# Patient Record
Sex: FEMALE | Race: WHITE | NOT HISPANIC OR LATINO | Employment: OTHER | URBAN - NONMETROPOLITAN AREA
[De-identification: names, ages, dates, MRNs, and addresses within clinical notes are randomized per-mention and may not be internally consistent; named-entity substitution may affect disease eponyms.]

---

## 2018-02-20 ENCOUNTER — OFFICE VISIT CONVERTED (OUTPATIENT)
Dept: FAMILY MEDICINE CLINIC | Age: 63
End: 2018-02-20
Attending: NURSE PRACTITIONER

## 2018-02-22 LAB
AGE GDLN ACOG TESTING: NORMAL
HPV I/H RISK 4 DNA CVX QL PROBE+SIG AMP: NEGATIVE

## 2018-08-29 ENCOUNTER — OFFICE VISIT CONVERTED (OUTPATIENT)
Dept: FAMILY MEDICINE CLINIC | Age: 63
End: 2018-08-29
Attending: NURSE PRACTITIONER

## 2019-04-10 ENCOUNTER — OFFICE VISIT CONVERTED (OUTPATIENT)
Dept: FAMILY MEDICINE CLINIC | Age: 64
End: 2019-04-10
Attending: NURSE PRACTITIONER

## 2019-04-10 ENCOUNTER — HOSPITAL ENCOUNTER (OUTPATIENT)
Dept: OTHER | Facility: HOSPITAL | Age: 64
Discharge: HOME | End: 2019-04-10
Attending: NURSE PRACTITIONER

## 2019-04-10 LAB
ALBUMIN SERPL-MCNC: 4.4 G/DL (ref 3.5–5)
ALBUMIN/GLOB SERPL: 1.6 {RATIO} (ref 1.4–2.6)
ALP SERPL-CCNC: 59 U/L (ref 43–160)
ALT SERPL-CCNC: 40 U/L (ref 10–40)
ANION GAP SERPL CALC-SCNC: 16 MMOL/L (ref 8–19)
AST SERPL-CCNC: 32 U/L (ref 15–50)
BILIRUB SERPL-MCNC: 0.44 MG/DL (ref 0.2–1.3)
BUN SERPL-MCNC: 8 MG/DL (ref 5–25)
BUN/CREAT SERPL: 14 {RATIO} (ref 6–20)
CALCIUM SERPL-MCNC: 9.5 MG/DL (ref 8.7–10.4)
CHLORIDE SERPL-SCNC: 102 MMOL/L (ref 99–111)
CHOLEST SERPL-MCNC: 171 MG/DL (ref 107–200)
CHOLEST/HDLC SERPL: 4.3 {RATIO} (ref 3–6)
CONV CO2: 28 MMOL/L (ref 22–32)
CONV TOTAL PROTEIN: 7.1 G/DL (ref 6.3–8.2)
CREAT UR-MCNC: 0.56 MG/DL (ref 0.5–0.9)
GFR SERPLBLD BASED ON 1.73 SQ M-ARVRAT: >60 ML/MIN/{1.73_M2}
GLOBULIN UR ELPH-MCNC: 2.7 G/DL (ref 2–3.5)
GLUCOSE SERPL-MCNC: 127 MG/DL (ref 65–99)
HDLC SERPL-MCNC: 40 MG/DL (ref 40–60)
LDLC SERPL CALC-MCNC: 89 MG/DL (ref 70–100)
OSMOLALITY SERPL CALC.SUM OF ELEC: 294 MOSM/KG (ref 273–304)
POTASSIUM SERPL-SCNC: 4.4 MMOL/L (ref 3.5–5.3)
SODIUM SERPL-SCNC: 142 MMOL/L (ref 135–147)
TRIGL SERPL-MCNC: 208 MG/DL (ref 40–150)
TSH SERPL-ACNC: 2.71 M[IU]/L (ref 0.27–4.2)
VLDLC SERPL-MCNC: 42 MG/DL (ref 5–37)

## 2019-04-11 ENCOUNTER — HOSPITAL ENCOUNTER (OUTPATIENT)
Dept: OTHER | Facility: HOSPITAL | Age: 64
Discharge: HOME | End: 2019-04-11
Attending: NURSE PRACTITIONER

## 2019-04-12 ENCOUNTER — HOSPITAL ENCOUNTER (OUTPATIENT)
Dept: OTHER | Facility: HOSPITAL | Age: 64
Discharge: HOME | End: 2019-04-12
Attending: NURSE PRACTITIONER

## 2019-04-12 LAB
EST. AVERAGE GLUCOSE BLD GHB EST-MCNC: 131 MG/DL
HBA1C MFR BLD: 6.2 % (ref 3.5–5.7)

## 2019-04-15 LAB
CONV LAST MENSTURAL PERIOD: NORMAL
SPECIMEN SOURCE: NORMAL
SPECIMEN SOURCE: NORMAL
THIN PREP CVX: NORMAL

## 2019-11-14 ENCOUNTER — HOSPITAL ENCOUNTER (OUTPATIENT)
Dept: SURGERY | Facility: HOSPITAL | Age: 64
Setting detail: HOSPITAL OUTPATIENT SURGERY
Discharge: HOME | End: 2019-11-14
Attending: OPHTHALMOLOGY

## 2019-11-27 ENCOUNTER — HOSPITAL ENCOUNTER (OUTPATIENT)
Dept: OTHER | Facility: HOSPITAL | Age: 64
Discharge: HOME | End: 2019-11-27
Attending: NURSE PRACTITIONER

## 2019-11-27 ENCOUNTER — OFFICE VISIT CONVERTED (OUTPATIENT)
Dept: FAMILY MEDICINE CLINIC | Age: 64
End: 2019-11-27
Attending: NURSE PRACTITIONER

## 2019-11-27 LAB
ALBUMIN SERPL-MCNC: 4.2 G/DL (ref 3.5–5)
ALBUMIN/GLOB SERPL: 1.5 {RATIO} (ref 1.4–2.6)
ALP SERPL-CCNC: 64 U/L (ref 43–160)
ALT SERPL-CCNC: 31 U/L (ref 10–40)
ANION GAP SERPL CALC-SCNC: 17 MMOL/L (ref 8–19)
AST SERPL-CCNC: 28 U/L (ref 15–50)
BILIRUB SERPL-MCNC: 0.36 MG/DL (ref 0.2–1.3)
BUN SERPL-MCNC: 11 MG/DL (ref 5–25)
BUN/CREAT SERPL: 19 {RATIO} (ref 6–20)
CALCIUM SERPL-MCNC: 9.5 MG/DL (ref 8.7–10.4)
CHLORIDE SERPL-SCNC: 101 MMOL/L (ref 99–111)
CHOLEST SERPL-MCNC: 169 MG/DL (ref 107–200)
CHOLEST/HDLC SERPL: 4.2 {RATIO} (ref 3–6)
CONV CO2: 27 MMOL/L (ref 22–32)
CONV TOTAL PROTEIN: 7 G/DL (ref 6.3–8.2)
CREAT UR-MCNC: 0.59 MG/DL (ref 0.5–0.9)
EST. AVERAGE GLUCOSE BLD GHB EST-MCNC: 148 MG/DL
GFR SERPLBLD BASED ON 1.73 SQ M-ARVRAT: >60 ML/MIN/{1.73_M2}
GLOBULIN UR ELPH-MCNC: 2.8 G/DL (ref 2–3.5)
GLUCOSE SERPL-MCNC: 120 MG/DL (ref 65–99)
HBA1C MFR BLD: 6.8 % (ref 3.5–5.7)
HDLC SERPL-MCNC: 40 MG/DL (ref 40–60)
LDLC SERPL CALC-MCNC: 78 MG/DL (ref 70–100)
OSMOLALITY SERPL CALC.SUM OF ELEC: 293 MOSM/KG (ref 273–304)
POTASSIUM SERPL-SCNC: 3.9 MMOL/L (ref 3.5–5.3)
SODIUM SERPL-SCNC: 141 MMOL/L (ref 135–147)
TRIGL SERPL-MCNC: 255 MG/DL (ref 40–150)
VLDLC SERPL-MCNC: 51 MG/DL (ref 5–37)

## 2020-03-04 ENCOUNTER — HOSPITAL ENCOUNTER (OUTPATIENT)
Dept: OTHER | Facility: HOSPITAL | Age: 65
Discharge: HOME | End: 2020-03-04
Attending: NURSE PRACTITIONER

## 2020-03-04 LAB
ALBUMIN SERPL-MCNC: 4.1 G/DL (ref 3.5–5)
ALBUMIN/GLOB SERPL: 1.6 {RATIO} (ref 1.4–2.6)
ALP SERPL-CCNC: 55 U/L (ref 43–160)
ALT SERPL-CCNC: 33 U/L (ref 10–40)
ANION GAP SERPL CALC-SCNC: 18 MMOL/L (ref 8–19)
AST SERPL-CCNC: 32 U/L (ref 15–50)
BILIRUB SERPL-MCNC: 0.34 MG/DL (ref 0.2–1.3)
BUN SERPL-MCNC: 10 MG/DL (ref 5–25)
BUN/CREAT SERPL: 16 {RATIO} (ref 6–20)
CALCIUM SERPL-MCNC: 9.5 MG/DL (ref 8.7–10.4)
CHLORIDE SERPL-SCNC: 103 MMOL/L (ref 99–111)
CONV CO2: 25 MMOL/L (ref 22–32)
CONV TOTAL PROTEIN: 6.7 G/DL (ref 6.3–8.2)
CREAT UR-MCNC: 0.64 MG/DL (ref 0.5–0.9)
GFR SERPLBLD BASED ON 1.73 SQ M-ARVRAT: >60 ML/MIN/{1.73_M2}
GLOBULIN UR ELPH-MCNC: 2.6 G/DL (ref 2–3.5)
GLUCOSE SERPL-MCNC: 155 MG/DL (ref 65–99)
OSMOLALITY SERPL CALC.SUM OF ELEC: 296 MOSM/KG (ref 273–304)
POTASSIUM SERPL-SCNC: 4.4 MMOL/L (ref 3.5–5.3)
SODIUM SERPL-SCNC: 142 MMOL/L (ref 135–147)

## 2020-03-05 LAB
EST. AVERAGE GLUCOSE BLD GHB EST-MCNC: 166 MG/DL
HBA1C MFR BLD: 7.4 % (ref 3.5–5.7)

## 2020-06-12 ENCOUNTER — HOSPITAL ENCOUNTER (OUTPATIENT)
Dept: OTHER | Facility: HOSPITAL | Age: 65
Discharge: HOME | End: 2020-06-12
Attending: NURSE PRACTITIONER

## 2020-06-12 LAB
ALBUMIN SERPL-MCNC: 4.1 G/DL (ref 3.5–5)
ALBUMIN/GLOB SERPL: 1.5 {RATIO} (ref 1.4–2.6)
ALP SERPL-CCNC: 59 U/L (ref 43–160)
ALT SERPL-CCNC: 38 U/L (ref 10–40)
ANION GAP SERPL CALC-SCNC: 17 MMOL/L (ref 8–19)
AST SERPL-CCNC: 32 U/L (ref 15–50)
BILIRUB SERPL-MCNC: 0.4 MG/DL (ref 0.2–1.3)
BUN SERPL-MCNC: 12 MG/DL (ref 5–25)
BUN/CREAT SERPL: 17 {RATIO} (ref 6–20)
CALCIUM SERPL-MCNC: 10 MG/DL (ref 8.7–10.4)
CHLORIDE SERPL-SCNC: 99 MMOL/L (ref 99–111)
CHOLEST SERPL-MCNC: 165 MG/DL (ref 107–200)
CHOLEST/HDLC SERPL: 4.5 {RATIO} (ref 3–6)
CONV CO2: 27 MMOL/L (ref 22–32)
CONV CREATININE URINE, RANDOM: 72.2 MG/DL (ref 10–300)
CONV MICROALBUM.,U,RANDOM: <12 MG/L (ref 0–20)
CONV TOTAL PROTEIN: 6.8 G/DL (ref 6.3–8.2)
CREAT UR-MCNC: 0.7 MG/DL (ref 0.5–0.9)
EST. AVERAGE GLUCOSE BLD GHB EST-MCNC: 166 MG/DL
GFR SERPLBLD BASED ON 1.73 SQ M-ARVRAT: >60 ML/MIN/{1.73_M2}
GLOBULIN UR ELPH-MCNC: 2.7 G/DL (ref 2–3.5)
GLUCOSE SERPL-MCNC: 131 MG/DL (ref 65–99)
HBA1C MFR BLD: 7.4 % (ref 3.5–5.7)
HDLC SERPL-MCNC: 37 MG/DL (ref 40–60)
LDLC SERPL CALC-MCNC: 88 MG/DL (ref 70–100)
MICROALBUMIN/CREAT UR: 16.6 MG/G{CRE} (ref 0–35)
OSMOLALITY SERPL CALC.SUM OF ELEC: 290 MOSM/KG (ref 273–304)
POTASSIUM SERPL-SCNC: 4.1 MMOL/L (ref 3.5–5.3)
SODIUM SERPL-SCNC: 139 MMOL/L (ref 135–147)
TRIGL SERPL-MCNC: 201 MG/DL (ref 40–150)
VLDLC SERPL-MCNC: 40 MG/DL (ref 5–37)

## 2020-06-15 ENCOUNTER — OFFICE VISIT CONVERTED (OUTPATIENT)
Dept: FAMILY MEDICINE CLINIC | Age: 65
End: 2020-06-15
Attending: NURSE PRACTITIONER

## 2020-07-22 ENCOUNTER — HOSPITAL ENCOUNTER (OUTPATIENT)
Dept: OTHER | Facility: HOSPITAL | Age: 65
Discharge: HOME | End: 2020-07-22
Attending: NURSE PRACTITIONER

## 2020-09-18 ENCOUNTER — HOSPITAL ENCOUNTER (OUTPATIENT)
Dept: OTHER | Facility: HOSPITAL | Age: 65
Discharge: HOME | End: 2020-09-18
Attending: NURSE PRACTITIONER

## 2020-09-18 LAB
ANION GAP SERPL CALC-SCNC: 15 MMOL/L (ref 8–19)
BUN SERPL-MCNC: 13 MG/DL (ref 5–25)
BUN/CREAT SERPL: 19 {RATIO} (ref 6–20)
CALCIUM SERPL-MCNC: 10.1 MG/DL (ref 8.7–10.4)
CHLORIDE SERPL-SCNC: 100 MMOL/L (ref 99–111)
CONV CO2: 30 MMOL/L (ref 22–32)
CREAT UR-MCNC: 0.68 MG/DL (ref 0.5–0.9)
GFR SERPLBLD BASED ON 1.73 SQ M-ARVRAT: >60 ML/MIN/{1.73_M2}
GLUCOSE SERPL-MCNC: 135 MG/DL (ref 65–99)
OSMOLALITY SERPL CALC.SUM OF ELEC: 294 MOSM/KG (ref 273–304)
POTASSIUM SERPL-SCNC: 4.4 MMOL/L (ref 3.5–5.3)
SODIUM SERPL-SCNC: 141 MMOL/L (ref 135–147)

## 2020-09-19 LAB
EST. AVERAGE GLUCOSE BLD GHB EST-MCNC: 160 MG/DL
HBA1C MFR BLD: 7.2 % (ref 3.5–5.7)

## 2020-09-22 ENCOUNTER — OFFICE VISIT CONVERTED (OUTPATIENT)
Dept: FAMILY MEDICINE CLINIC | Age: 65
End: 2020-09-22
Attending: NURSE PRACTITIONER

## 2021-01-09 ENCOUNTER — HOSPITAL ENCOUNTER (OUTPATIENT)
Dept: OTHER | Facility: HOSPITAL | Age: 66
Discharge: HOME | End: 2021-01-09
Attending: NURSE PRACTITIONER

## 2021-01-09 LAB
ALBUMIN SERPL-MCNC: 4.2 G/DL (ref 3.5–5)
ALBUMIN/GLOB SERPL: 1.7 {RATIO} (ref 1.4–2.6)
ALP SERPL-CCNC: 63 U/L (ref 43–160)
ALT SERPL-CCNC: 34 U/L (ref 10–40)
ANION GAP SERPL CALC-SCNC: 14 MMOL/L (ref 8–19)
AST SERPL-CCNC: 32 U/L (ref 15–50)
BILIRUB SERPL-MCNC: 0.37 MG/DL (ref 0.2–1.3)
BUN SERPL-MCNC: 10 MG/DL (ref 5–25)
BUN/CREAT SERPL: 14 {RATIO} (ref 6–20)
CALCIUM SERPL-MCNC: 9.5 MG/DL (ref 8.7–10.4)
CHLORIDE SERPL-SCNC: 100 MMOL/L (ref 99–111)
CHOLEST SERPL-MCNC: 166 MG/DL (ref 107–200)
CHOLEST/HDLC SERPL: 5.2 {RATIO} (ref 3–6)
CONV CO2: 28 MMOL/L (ref 22–32)
CONV TOTAL PROTEIN: 6.7 G/DL (ref 6.3–8.2)
CREAT UR-MCNC: 0.7 MG/DL (ref 0.5–0.9)
EST. AVERAGE GLUCOSE BLD GHB EST-MCNC: 166 MG/DL
GFR SERPLBLD BASED ON 1.73 SQ M-ARVRAT: >60 ML/MIN/{1.73_M2}
GLOBULIN UR ELPH-MCNC: 2.5 G/DL (ref 2–3.5)
GLUCOSE SERPL-MCNC: 154 MG/DL (ref 65–99)
HBA1C MFR BLD: 7.4 % (ref 3.5–5.7)
HDLC SERPL-MCNC: 32 MG/DL (ref 40–60)
LDLC SERPL CALC-MCNC: 91 MG/DL (ref 70–100)
OSMOLALITY SERPL CALC.SUM OF ELEC: 288 MOSM/KG (ref 273–304)
POTASSIUM SERPL-SCNC: 4.1 MMOL/L (ref 3.5–5.3)
SODIUM SERPL-SCNC: 138 MMOL/L (ref 135–147)
TRIGL SERPL-MCNC: 217 MG/DL (ref 40–150)
VLDLC SERPL-MCNC: 43 MG/DL (ref 5–37)

## 2021-03-11 ENCOUNTER — OFFICE VISIT CONVERTED (OUTPATIENT)
Dept: FAMILY MEDICINE CLINIC | Age: 66
End: 2021-03-11
Attending: NURSE PRACTITIONER

## 2021-03-15 ENCOUNTER — HOSPITAL ENCOUNTER (OUTPATIENT)
Dept: VACCINE CLINIC | Facility: HOSPITAL | Age: 66
Discharge: HOME | End: 2021-03-15
Attending: INTERNAL MEDICINE

## 2021-04-05 ENCOUNTER — HOSPITAL ENCOUNTER (OUTPATIENT)
Dept: VACCINE CLINIC | Facility: HOSPITAL | Age: 66
Discharge: HOME | End: 2021-04-05
Attending: INTERNAL MEDICINE

## 2021-05-07 ENCOUNTER — HOSPITAL ENCOUNTER (OUTPATIENT)
Dept: OTHER | Facility: HOSPITAL | Age: 66
Discharge: HOME | End: 2021-05-07
Attending: NURSE PRACTITIONER

## 2021-05-07 LAB
ALBUMIN SERPL-MCNC: 4.5 G/DL (ref 3.5–5)
ALBUMIN/GLOB SERPL: 1.7 {RATIO} (ref 1.4–2.6)
ALP SERPL-CCNC: 58 U/L (ref 43–160)
ALT SERPL-CCNC: 35 U/L (ref 10–40)
ANION GAP SERPL CALC-SCNC: 16 MMOL/L (ref 8–19)
AST SERPL-CCNC: 42 U/L (ref 15–50)
BILIRUB SERPL-MCNC: 0.42 MG/DL (ref 0.2–1.3)
BUN SERPL-MCNC: 10 MG/DL (ref 5–25)
BUN/CREAT SERPL: 15 {RATIO} (ref 6–20)
CALCIUM SERPL-MCNC: 9.8 MG/DL (ref 8.7–10.4)
CHLORIDE SERPL-SCNC: 102 MMOL/L (ref 99–111)
CHOLEST SERPL-MCNC: 157 MG/DL (ref 107–200)
CHOLEST/HDLC SERPL: 4.5 {RATIO} (ref 3–6)
CONV CO2: 27 MMOL/L (ref 22–32)
CONV CREATININE URINE, RANDOM: 34.3 MG/DL (ref 10–300)
CONV MICROALBUM.,U,RANDOM: <12 MG/L (ref 0–20)
CONV TOTAL PROTEIN: 7.2 G/DL (ref 6.3–8.2)
CREAT UR-MCNC: 0.68 MG/DL (ref 0.5–0.9)
EST. AVERAGE GLUCOSE BLD GHB EST-MCNC: 157 MG/DL
GFR SERPLBLD BASED ON 1.73 SQ M-ARVRAT: >60 ML/MIN/{1.73_M2}
GLOBULIN UR ELPH-MCNC: 2.7 G/DL (ref 2–3.5)
GLUCOSE SERPL-MCNC: 104 MG/DL (ref 65–99)
HBA1C MFR BLD: 7.1 % (ref 3.5–5.7)
HDLC SERPL-MCNC: 35 MG/DL (ref 40–60)
LDLC SERPL CALC-MCNC: 83 MG/DL (ref 70–100)
MICROALBUMIN/CREAT UR: 35 MG/G{CRE} (ref 0–35)
OSMOLALITY SERPL CALC.SUM OF ELEC: 291 MOSM/KG (ref 273–304)
POTASSIUM SERPL-SCNC: 4.2 MMOL/L (ref 3.5–5.3)
SODIUM SERPL-SCNC: 141 MMOL/L (ref 135–147)
TRIGL SERPL-MCNC: 195 MG/DL (ref 40–150)
VLDLC SERPL-MCNC: 39 MG/DL (ref 5–37)

## 2021-05-18 NOTE — PROGRESS NOTES
Ro Conti  1955     Office/Outpatient Visit    Visit Date: Tue, Sep 22, 2020 11:07 am    Provider: Jesusita Rodriguez N.P. (Assistant: Loren Anne, )    Location: [facility]        Electronically signed by Jesusita Rodriguez N.P. on 09/22/2020 12:05:11 pm  Subjective:        CC: Ms. Conti is a 65 year old White female.  This is a follow-up visit.          HPI:           Patient to be evaluated for mixed hyperlipidemia.  Most recent lab tests include Hemoglobin A1c:  7.2 (%) (09/18/2020), HDL:  37 (mg/dL) (06/12/2020), Glucose, Serum:  135 (mg/dL) (09/18/2020), Total Cholesterol:  165 (mg/dL) (06/12/2020), Triglycerides:  201 (mg/dL) (06/12/2020), LDL:  88 (mg/dL) (06/12/2020).  Currently, she is taking Lipitor.  Nonpharmacologic treatment has included diet and exercise.  Compliance with treatment has been good; she takes his medication as directed, maintains her diet and exercise regimen, and follows up as directed.  She denies experiencing any hypercholesterolemia related symptoms.            Concerning essential (primary) hypertension, current nonpharmacologic treatment includes low sodium diet and exercise.  Her current cardiac medication regimen includes a diuretic ( Hydrochlorothiazide ) and a beta-blocker ( Atenolol ).  Compliance with treatment has been good; she takes her medication as directed, maintains her diet and exercise regimen, and follows up as directed.  She is tolerating the medication well without side effects.  Ms. Conti does not check her blood pressure other than at her clinic appointments.            In regard to the type 2 diabetes mellitus without complications, compliance with treatment has been good; she takes her medication as directed, maintains her diet and exercise regimen, and follows up as directed.  Typical diet includes low salt recipes, low carbohydrate, dairy products, vegetables, and fruit.  She denies experiencing any diabetes  related symptoms.  Current meds include an oral hypoglycemic ( Glucophage ( 500mg qd ) ).  She does not perform home blood glucose monitoring.      Physical Activity: Exercises at least 3 times per week;     ROS:     CONSTITUTIONAL:  Negative for chills and fever.      CARDIOVASCULAR:  Negative for chest pain and palpitations.      RESPIRATORY:  Negative for recent cough and dyspnea.      NEUROLOGICAL:  Negative for paresthesias and weakness.      PSYCHIATRIC:  Negative for anxiety, depression and sleep disturbance.          Past Medical History / Family History / Social History:         Last Reviewed on 2020 11:15 AM by Jesusita Rodriguez    Past Medical History:                 PAST MEDICAL HISTORY             GYNECOLOGICAL HISTORY:        Menopause at age 50.    Last Pap was 19         PREVENTIVE HEALTH MAINTENANCE             COLORECTAL CANCER SCREENING: Up to date (colonoscopy q10y; sigmoidoscopy q5y; Cologuard q3y) was last done 3-2016, Results are in chart; colonoscopy with the following abnormalities noted-- scattered diverticulosis; The next colonoscopy is due  3-2021     Hepatitis C Medicare Screening: was last done ; negative     MAMMOGRAM: Done within last 2 years and results in are chart was last done 20 stable         Surgical History:         Cataract Removal: right; ;     : X 1;     Tubal Ligation     lasik ;     Hernia Repair: ; umbilical;     Procedures:    Colonoscopy ( 3-11-16 dr snyder, scattered divertiuclar dx )         Family History:     Father:  at age 89; Cause of death was septic meningitis;  Hypertension;  Colon Cancer; Prostate Cancer; Skin Cancer ( melanoma );  Dementia     Mother:  at age 84; Cause of death was brain bleed;  Hypertension;  Pulmonary Hypertension;  Renal Insufficency     Brother(s): 3 brother(s) total; 1 ;  Prostate Cancer;  MVA     Sister(s): 4 sister(s) total; 1 ;  Breast Cancer ( 2  sisters ); Colon Cancer     Son(s): Hernandez sarcoma     Maternal Grandmother: Coronary Artery Disease         Social History:     Occupation: Retired (Prior occupation: [employer] working part time at [employer] office;     Marital Status:      Children: 1 child     Hobbies/Recreation: she enjoys [employer];         Tobacco/Alcohol/Supplements:     Last Reviewed on 9/22/2020 11:15 AM by Jesusita Rodriguez    Tobacco: Current Smoker: Currently smokes cigarettes some days.          Alcohol: Frequency:    2-3 times per week; When she drinks, the average quantity of alcohol is 2-12 drinks.   She typically consumes beer.  but only socially         Immunizations:     None        Allergies:     Last Reviewed on 9/22/2020 11:09 AM by Loren Anne    No Known Allergies.        Current Medications:     Last Reviewed on 9/22/2020 11:28 AM by Jesusita Rodriguez    omega-3 fatty acids 1,000 mg oral capsule [1000 mg 1 po QDay]    atenolol 50 mg oral tablet [Take 1 tablet(s) by mouth daily]    hydroCHLOROthiazide 25 mg oral tablet [one a day]    Lipitor 20 mg oral tablet [1 tab daily]    metFORMIN 500 mg oral Tablet, Extended Release 24 hr [take 1 tablet (500 mg) by oral route once daily with the largest  meal]        Objective:        Vitals:         Current: 9/22/2020 11:11:48 AM    Ht:  5 ft, 2.375 in;  Wt: 254.2 lbs;  BMI: 45.9T: 96.7 F (temporal);  BP: 140/75 mm Hg (left arm, sitting);  P: 54 bpm (left arm (BP Cuff), sitting);  sCr: 0.68 mg/dL;  GFR: 103.44        Exams:     PHYSICAL EXAM:     GENERAL: vital signs recorded - well developed, well nourished;  no apparent distress;     NECK: carotid exam reveals no bruits;     RESPIRATORY: normal respiratory rate and pattern with no distress; normal breath sounds with no rales, rhonchi, wheezes or rubs;     CARDIOVASCULAR: normal rate; rhythm is regular;  no systolic murmur; no edema;     NEUROLOGIC: GROSSLY INTACT     PSYCHIATRIC:  appropriate  affect and demeanor; normal speech pattern; grossly normal memory;         Assessment:         E78.2   Mixed hyperlipidemia       I10   Essential (primary) hypertension       E11.9   Type 2 diabetes mellitus without complications       Z13.31   Encounter for screening for depression           ORDERS:         Meds Prescribed:       [Refilled] metFORMIN 500 mg oral Tablet, Extended Release 24 hr [take 1 tablet (500 mg) by oral route once daily with the largest  meal], #90 (ninety) tablets, Refills: 0 (zero)         Lab Orders:       FUTURE  Future order to be done at patients convenience  (Send-Out)            [id number]  [address] LIPID,CMP, A1C: 78979, 09726, [address]204  (Send-Out)                      Plan:         Mixed hyperlipidemiashe does not want a flu shot / never gets them        RECOMMENDATIONS given include: exercise, low cholesterol/low fat diet, and weight loss.      FOLLOW-UP: Schedule a follow-up visit in 6 months.  but will need labs in 3 months         Essential (primary) hypertension        RECOMMENDATIONS given include: perform routine monitoring of blood pressure with home blood pressure cuff, exercise, reduction of dietary salt intake, take medication as prescribed, try not to miss doses, smoking cessation, and weight loss.      FOLLOW-UP: Schedule a follow-up visit in 6 months.          Type 2 diabetes mellitus without complicationsoffered a glucose meter to monitor her glucose / she does not want one at this time / at next visit to do a foot exam/ reviewed diabetes flow sheet/discussed increasing metformin dose, patient would like to hold off on that        RECOMMENDATIONS: weight loss,  HgbA1C every 6 months, LDL cholesterol test yearly, and work on a low carb diet.      FOLLOW-UP: fasting for labs in 3 months     FOLLOW-UP TESTING #1: FOLLOW-UP LABORATORY:  Labs to be scheduled in the future include Diabetes Panel 1; CMP, Lipid, A1C.   Patient to schedule in 3 months.            Prescriptions:        "[Refilled] metFORMIN 500 mg oral Tablet, Extended Release 24 hr [take 1 tablet (500 mg) by oral route once daily with the largest  meal], #90 (ninety) tablets, Refills: 0 (zero)           Orders:       FUTURE  Future order to be done at patients convenience  (Send-Out)            [address]  [address] LIPID,CMP, A1C: 77252, 55721, 58590  (Send-Out)              Encounter for screening for depression    MIPS PHQ-9 Depression Screening: Completed form scanned and in chart; Total Score 0; Negative Depression Screen             Patient Recommendations:        For  Mixed hyperlipidemia:    Maintain a regular exercise program. Reduce the amount of cholesterol and saturated fat in your diet. Try to lose some weight; even modest weight reduction can improve your blood pressure.  Schedule a follow-up visit in 6 months.          For  Essential (primary) hypertension:    Begin monitoring your blood pressure by brief nurse visits at our office, a home blood pressure monitor, or by checking on the machines in pharmacies or stores.  Keep a log of the readings. Maintain a regular exercise program. Reduce the amount of salt in your diet. Stop smoking! Try to lose some weight; even modest weight reduction can improve your blood pressure.  Schedule a follow-up visit in 6 months.          For  Type 2 diabetes mellitus without complications:    Attempt weight loss as directed. Have blood checked regularly for long term glucose control (a test called \"hemoglobin A1C\"). In your case, the hemoglobin A1C should be checked at least every 6 months.          The following laboratory testing has been ordered: Schedule the above testing in 3 months.              Charge Capture:         Primary Diagnosis:     E78.2  Mixed hyperlipidemia           Orders:      27710  Office/outpatient visit; established patient, level 4  (In-House)              I10  Essential (primary) hypertension     E11.9  Type 2 diabetes mellitus without complications     Z13.31 "  Encounter for screening for depression         ADDENDUMS:      ____________________________________    Addendum: 09/24/2020 03:10 PM - Jesusita Rodriguez        Add [id number]; Remove [id number]

## 2021-05-18 NOTE — PROGRESS NOTES
Ro Conti 1955     Office/Outpatient Visit    Visit Date: Wed, Apr 10, 2019 08:24 am    Provider: Jesusita Rodriguez N.P. (Assistant: Mery Banks MA)    Location: [facility]        Electronically signed by Jesusita Rodriguez N.P. on  04/10/2019 09:34:55 AM                             SUBJECTIVE:        CC:     Ms. Conti is a 64 year old White female.  She presents with WWE.          HPI:         Well Woman Exam noted.  Her last physical exam was 1 year ago.  She is menopausal.  She performs breast self-exams occasionally.    Her last Pap smear was in 2/20/2018 and was normal.   Her last mammogram was in 2/26/2018 and was normal.   Her last DEXA was in 8/19/2008 and was normal.   Preventative Health updated today.  Ms. Conti has had an abnormal Pap smear in the past. HPV + 2017 Tobacco: Current Smoker: Currently smokes cigarettes some days.              PHQ-9 Depression Screening: Completed form scanned and in chart; Total Score 0 Alcohol Consumption Screening: Completed form scanned and in chart; Total Score 7         Dx with hypercholesterolemia; current treatment includes Lipitor.  Compliance with treatment has been good; she takes her medication as directed.  Most recent lab tests include Glucose, Serum:  107 (mg/dL) (08/29/2018), Total Cholesterol:  160 (mg/dL) (08/29/2018), HDL:  37 (mg/dL) (08/29/2018), Triglycerides:  254 (mg/dL) (08/29/2018), LDL:  72 (mg/dL) (08/29/2018), VLDL Cholesterol:  51 (mg/dl) (08/29/2018), ALT (SGPT):  31 (U/L) (08/29/2018), Hepatitis C Antibody:  <0.1 (s/co ratio) (10/08/2013).      ROS:     CONSTITUTIONAL:  Negative for chills, fatigue, fever, and weight change.      EYES:  Negative for blurred vision, eye pain, and photophobia.      E/N/T:  Negative for hearing problems, E/N/T pain, congestion, rhinorrhea, epistaxis, hoarseness, and dental problems.      CARDIOVASCULAR:  Negative for chest pain, palpitations, tachycardia, orthopnea, and  edema.      RESPIRATORY:  Negative for cough, dyspnea, and hemoptysis.      GASTROINTESTINAL:  Negative for abdominal pain, heartburn, constipation, diarrhea, and stool changes.      GENITOURINARY:  Negative for dysuria, hematuria and vaginal discharge.      MUSCULOSKELETAL:  Negative for arthralgias, back pain, and myalgias.      INTEGUMENTARY/BREAST:  Negative for atypical moles, dry skin, pruritis, rashes, breast masses, and nipple discharge.      NEUROLOGICAL:  Negative for dizziness, headaches, paresthesias, and weakness.      ENDOCRINE:  Negative for hair loss, heat/cold intolerance, polydipsia, and polyphagia.      PSYCHIATRIC:  Negative for anxiety, depression, and sleep disturbances.          PMH/FMH/SH:     Last Reviewed on 4/10/2019 08:54 AM by Jesusita Rodriguez    Past Medical History:             GYNECOLOGICAL HISTORY:        Menopause at age 50.          PREVENTIVE HEALTH MAINTENANCE             COLORECTAL CANCER SCREENING: Up to date (colonoscopy q10y; sigmoidoscopy q5y; Cologuard q3y) was last done 3-2016, Results are in chart; colonoscopy with the following abnormalities noted-- scattered diverticulosis; The next colonoscopy is due  3-2021     Hepatitis C Medicare Screening: was last done ; negative     MAMMOGRAM: Done within last 2 years and results in are chart was last done 2018 stable         Surgical History:         : X 1;     Tubal Ligation Procedures: colonoscopy /normal     Hernia Repair: ; umbilical;     Procedures:    Colonoscopy ( 3-11-16 dr snyder, scattered divertiuclar dx )         Family History:     Father:  at age 89; Cause of death was septic meningitis;  Hypertension;  Colon Cancer; Prostate Cancer; Skin Cancer ( melanoma );  Dementia     Mother:  at age 84; Cause of death was brain bleed;  Hypertension;  Pulmonary Hypertension;  Renal Insufficency     Brother(s): 3 brother(s) total; 1 ;  Prostate Cancer;  MVA      Sister(s): 4 sister(s) total; 1 ;  Breast Cancer ( 2 sisters ); Colon Cancer     Son(s): Hernandez sarcoma     Maternal Grandmother: Coronary Artery Disease         Social History:     Occupation: Retired (Prior occupation: "[employer])     Marital Status:      Children: 1 child     Hobbies/Recreation: she enjoys [employer];         Tobacco/Alcohol/Supplements:     Last Reviewed on 4/10/2019 08:39 AM by Mery Banks    Tobacco: Current Smoker: Currently smokes cigarettes some days.          Alcohol: Frequency:    2-3 times per week; When she drinks, the average quantity of alcohol is 2-12 drinks.   She typically consumes beer.  but only socially         Substance Abuse History:     None             Immunizations:     None        Allergies:     Last Reviewed on 4/10/2019 08:31 AM by Mery Banks      No Known Drug Allergies.         Current Medications:     Last Reviewed on 4/10/2019 08:31 AM by Mery Banks    Atenolol 50mg Tablet Take 1 tablet(s) by mouth daily     Hydrochlorothiazide (HCTZ) 25mg Tablet Take  one half  tablet(s) by mouth daily     Lipitor 20mg Tablet 1 tab daily     Omega 3 fatty acids Capsules 1000 mg 1 po QDay         OBJECTIVE:        Vitals:         Current: 4/10/2019 8:39:02 AM    Ht:  5 ft, 2.375 in;  Wt: 251.8 lbs;  BMI: 45.5    T: 98.3 F (oral);  BP: 144/92 mm Hg (left arm, sitting);  P: 61 bpm (left arm (BP Cuff), sitting);  sCr: 0.73 mg/dL;  GFR: 97.17        Exams:     PHYSICAL EXAM:     GENERAL: vital signs recorded - well developed, well nourished;  no apparent distress;     EYES: extraocular movements intact; conjunctiva and cornea are normal; PERRLA;     E/N/T:  normal EACs, TMs, nasal/oral mucosa, teeth, gingiva, and oropharynx;     NECK: range of motion is normal; thyroid is non-palpable;     RESPIRATORY: normal respiratory rate and pattern with no distress; normal breath sounds with no rales, rhonchi, wheezes or rubs;     CARDIOVASCULAR:  normal rate; rhythm is regular;  no systolic murmur; no edema;     GASTROINTESTINAL: nontender; normal bowel sounds; no organomegaly; rectal exam: normal tone; nontender, guaiac negative stool;     GENITOURINARY: Pap smear taken;  external genitalia: normal without lesions or urethral abnormalities;; vagina: atrophic mucosa; ;  cervix: normal appearance without lesions or discharge;;  uterus: normal size and position, well-supported;;  adnexa: normal with no masses or tenderness     LYMPHATIC: no enlargement of cervical or facial nodes; no axillary adenopathy;     BREAST/INTEGUMENT: BREASTS: breast exam is normal without masses, skin changes, or nipple discharge; SKIN: no significant rashes or lesions; no suspicious moles;     MUSCULOSKELETAL:  Normal range of motion, strength and tone;     NEUROLOGIC: GROSSLY INTACT     PSYCHIATRIC:  appropriate affect and demeanor; normal speech pattern; grossly normal memory;         ASSESSMENT:           V72.31     Well Woman Exam     V79.0   Z13.89  Screening for depression              DDx:     V76.41   Z12.12  Screening for rectal cancer              DDx:     272.0   E78.2  Hypercholesterolemia              DDx:     401.1   I10  Hypertension              DDx:         ORDERS:         Meds Prescribed:       Refill of: Lipitor (Atorvastatin Calcium) 20mg Tablet 1 tab daily  #90 (Ninety) tablet(s) Refills: 1       Refill of: Atenolol 50mg Tablet Take 1 tablet(s) by mouth daily  #90 (Ninety) tablet(s) Refills: 1       Refill of: Hydrochlorothiazide (HCTZ) 25mg Tablet one a day  #90 (Ninety) tablet(s) Refills: 1         Radiology/Test Orders:       41671  Screening digital breast tomosynthesis bi  (Send-Out)           Lab Orders:       16613  Cytopathology, slides, cervical or vaginal; manual screening under MD supervision  (Send-Out)         34333  HTNLP - [facility] CMP AND LIPID: [id number], [id number]  (Send-Out)         70096  TSH - [facility] TSH  (Send-Out)         99900  Occult blood, fecal   (In-House)           Other Orders:         Depression screen negative  (In-House)         4004F  Pt scrnd tobacco use rcvd tobacco cessation talk  (In-House)           Calculated BMI above the upper parameter and a follow-up plan was documented in the medical record  (In-House)                   PLAN:          Well Woman Exam advised to get new shingles and hep a vaccines         COUNSELING was provided today regarding the following topics: healthy eating habits, low salt diet, regular exercise, breast self-exam, limitation of alcohol intake, and fall prevention.      RADIOLOGY:  I have ordered Mammogram Bilateral Screening 3D to be done today.  MIPS Smoking cessation encouraged. Counseling for less than 3 minutes.      BMI Elevated - Follow-Up Plan: She was provided education on weight loss strategies           Orders:      03727  Cytopathology, slides, cervical or vaginal; manual screening under MD supervision  (Send-Out)         25042  Screening digital breast tomosynthesis bi  (Send-Out)         4004F  Pt scrnd tobacco use rcvd tobacco cessation talk  (In-House)           Calculated BMI above the upper parameter and a follow-up plan was documented in the medical record  (In-House)            Screening for depression     MIPS PHQ-9 Depression Screening Completed form scanned and in chart; Total Score 0           Orders:         Depression screen negative  (In-House)            Screening for rectal cancer           Orders:       28883  Occult blood, fecal  (In-House)   [place] [facility]cholesterolemia     LABORATORY:  Labs ordered to be performed today include TSH.            Prescriptions:       Refill of: Lipitor (Atorvastatin Calcium) 20mg Tablet 1 tab daily  #90 (Ninety) tablet(s) Refills: 1           Orders:       77853  TSH - [facility] TSH  (Send-Out)            Hypertension increase her HCTZ to 25 mg daily     LABORATORY:  Labs ordered to be performed today include HTN/Lipid Panel: CMP,  Lipid.      RECOMMENDATIONS given include: Advised about free BP checks on the last Saturday of each month.            Prescriptions:       Refill of: Atenolol 50mg Tablet Take 1 tablet(s) by mouth daily  #90 (Ninety) tablet(s) Refills: 1       Refill of: Hydrochlorothiazide (HCTZ) 25mg Tablet one a day  #90 (Ninety) tablet(s) Refills: 1           Orders:       70412  HTN - [facility] CMP AND LIPID: [id number], [id number]  (Send-Out)               Patient Recommendations:        For  Well Woman Exam:         Limit dietary intake of fat (especially saturated fat) and cholesterol.  Eat a variety of foods, including plenty of fruits, vegetables, and grain containg fiber, limit fat intake to 30% of total calories. Balance caloric intake with energy expended.    Maintaining regular physical activity is advised to help prevent heart disease, hypertension, diabetes, and obesity.    You should regularly examine your breasts, easily done while in the shower or with lotion.  Feel and look for differences in consistency from month to month, especially noting knots or lumps, changes in skin appearance, nipple retraction or discharge.    Regularly exercise within recommended guidelines, especially to maintain balance. Remove obstacles in walkways at home.  Use non-skid material for bathtub safety.  Remove loose throw rugs from floor. Use nightlights in bedrooms, hallways, and bathrooms.              CHARGE CAPTURE:           Primary Diagnosis:     V72.31    Well Woman Exam                Z01.419    Encounter for gynecological examination (general) (routine) without abnormal findings                       Orders:          72941   Preventive medicine, established patient, age 40-64 years  (In-House)             4004F   Pt scrnd tobacco use rcvd tobacco cessation talk  (In-House)                Calculated BMI above the upper parameter and a follow-up plan was documented in the medical record  (In-House)           V79.0 Screening for  depression            Z13.89    Encounter for screening for other disorder              Orders:             Depression screen negative  (In-House)           V76.41 Screening for rectal cancer            Z12.12    Encounter for screening for malignant neoplasm of rectum              Orders:          08091   Occult blood, fecal  (In-House)           272.0 Hypercholesterolemia            E78.2    Mixed hyperlipidemia    401.1 Hypertension            I10    Essential (primary) hypertension        ADDENDUMS:      ____________________________________    Addendum: 04/10/2019 11:02 AM - Alysa Omer        Please add [phone] handling fee.

## 2021-05-18 NOTE — PROGRESS NOTES
Ro Conti  1955     Office/Outpatient Visit    Visit Date: Thu, Mar 11, 2021 09:01 am    Provider: Jesusita Rodriguez N.P. (Assistant: Court Vail MA)    Location: [facility]        Electronically signed by Jesusita Rodriguez N.P. on  03/11/2021 02:04:10 PM                             Subjective:        CC: Ms. Conti is a 66 year old White female.  This is a follow-up visit.          HPI:           Patient to be evaluated for mixed hyperlipidemia.  Current treatment includes Lipitor.  Compliance with treatment has been good; she takes her medication as directed.  She denies experiencing any hypercholesterolemia related symptoms.            Essential (primary) hypertension details; her current cardiac medication regimen includes a diuretic ( Hydrochlorothiazide (HCTZ) ) and a beta-blocker ( Tenormin ).  Ms. Conti does not check her blood pressure other than at her clinic appointments.  She is tolerating the medication well without side effects.            In regard to the type 2 diabetes mellitus without complications, current meds include an oral hypoglycemic ( metformin ).  She does not perform home blood glucose monitoring.  Most recent lab results include Weight (lb):  254.2 (09/22/2020), Hemoglobin A1c:  7.4 (%) (01/09/2021), LDL:  91 (mg/dL) (01/09/2021), HDL:  32 (mg/dL) (01/09/2021), Triglycerides:  217 (mg/dL) (01/09/2021), Microalbuminuria:  16.6 (mg/g creat) (06/12/2020).  Has worked on her diet and exercise.      ROS:     CONSTITUTIONAL:  Negative for fever.      CARDIOVASCULAR:  Negative for chest pain, palpitations, tachycardia, orthopnea, and edema.      RESPIRATORY:  Negative for recent cough and dyspnea.      NEUROLOGICAL:  Negative for dizziness, headaches, paresthesias, and weakness.          Past Medical History / Family History / Social History:         Last Reviewed on 3/11/2021 10:01 AM by Jesusita Rodriguez    Past Medical History:                  PAST MEDICAL HISTORY             GYNECOLOGICAL HISTORY:        Menopause at age 50.    Last Pap was 19         PREVENTIVE HEALTH MAINTENANCE             COLORECTAL CANCER SCREENING: Up to date (colonoscopy q10y; sigmoidoscopy q5y; Cologuard q3y) was last done 3-2016, Results are in chart; colonoscopy with the following abnormalities noted-- scattered diverticulosis; The next colonoscopy is due  3-2021     Hepatitis C Medicare Screening: was last done ; negative     MAMMOGRAM: Done within last 2 years and results in are chart was last done 20 stable         Surgical History:         Cataract Removal: right; ;     : X 1;     Tubal Ligation     lasik ;     Hernia Repair: ; umbilical;     Procedures:    Colonoscopy ( 3-11-16 dr snyder, scattered divertiuclar dx )         Family History:     Father:  at age 89; Cause of death was septic meningitis;  Hypertension;  Colon Cancer; Prostate Cancer; Skin Cancer ( melanoma );  Dementia     Mother:  at age 84; Cause of death was brain bleed;  Hypertension;  Pulmonary Hypertension;  Renal Insufficency     Brother(s): 3 brother(s) total; 1 ;  Prostate Cancer;  MVA     Sister(s): 4 sister(s) total; 1 ;  Breast Cancer ( 2 sisters ); Colon Cancer     Son(s): Hernandez sarcoma     Maternal Grandmother: Coronary Artery Disease         Social History:     Occupation: Retired (Prior occupation: [employer]). working part time at [employer] office;     Marital Status:      Children: 1 child     Hobbies/Recreation: she enjoys quilting;         Tobacco/Alcohol/Supplements:     Last Reviewed on 3/11/2021 09:05 AM by Court Vail    Tobacco: Current Smoker: Currently smokes cigarettes some days.          Alcohol: Frequency:    2-3 times per week; When she drinks, the average quantity of alcohol is 2-12 drinks.   She typically consumes beer.  but only socially         Substance  Abuse History:     None         Immunizations:     None        Allergies:     Last Reviewed on 3/11/2021 09:05 AM by Court Vail    No Known Allergies.        Current Medications:     Last Reviewed on 3/11/2021 09:05 AM by Court Vail    omega-3 fatty acids 1,000 mg oral capsule [1000 mg 1 po QDay]    Lipitor 20 mg oral tablet [1 tab daily]    hydroCHLOROthiazide 25 mg oral tablet [one a day]    atenoloL 50 mg oral tablet [Take 1 tablet(s) by mouth daily]    metFORMIN 500 mg oral Tablet, Extended Release 24 hr [take 1 tablet (500 mg) by oral route once daily with the largest  meal]        Objective:        Vitals:         Current: 3/11/2021 9:08:02 AM    Ht:  5 ft, 2.375 in;  Wt: 246 lbs;  BMI: 44.5T: 96.3 F (temporal);  BP: 134/71 mm Hg (right arm, sitting);  P: 55 bpm (right arm (BP Cuff), sitting);  sCr: 0.7 mg/dL;  GFR: 97.85        Exams:     PHYSICAL EXAM:     GENERAL: vital signs recorded - well developed, well nourished;  no apparent distress;     NECK: carotid exam reveals no bruits;     RESPIRATORY: normal respiratory rate and pattern with no distress; normal breath sounds with no rales, rhonchi, wheezes or rubs;     CARDIOVASCULAR: normal rate; rhythm is regular;  no systolic murmur;    Peripheral Pulses: posterior tibial: equal bilaterally;  no edema;     BREAST/INTEGUMENT: skin of feet and toenails intact;     NEUROLOGIC: sensation intact to monofilament bilaterally;     PSYCHIATRIC:  appropriate affect and demeanor; normal speech pattern; grossly normal memory;         Assessment:         E78.2   Mixed hyperlipidemia       I10   Essential (primary) hypertension       E11.9   Type 2 diabetes mellitus without complications           ORDERS:         Meds Prescribed:       [Queued Refill] atenoloL 50 mg oral tablet [Take 1 tablet(s) by mouth daily], #90 (ninety) tablets, Refills: 1 (one)       [Queued Refill] hydroCHLOROthiazide 25 mg oral tablet [one a day], #90 (ninety) tablets, Refills: 1  (one)       [Queued Refill] Lipitor 20 mg oral tablet [1 tab daily], #90 (ninety) tablets, Refills: 1 (one)       [Queued Refill] metFORMIN 500 mg oral Tablet, Extended Release 24 hr [take 1 tablet (500 mg) by oral route once daily with the largest  meal], #90 (ninety) tablets, Refills: 1 (one)         Lab Orders:       FUTURE  Future order to be done at patients convenience  (Send-Out)            [id number]  [facility] CMP A1C LIPID AND MICRO ALBUM CR RATIO: [id number]  (Send-Out)            APPTO  Appointment need  (In-House)                      Plan:         [facility], she  is getting her covid vaccines at [facility]  next week        RECOMMENDATIONS given include: exercise, low cholesterol/low fat diet, and weight loss.      FOLLOW-UP: fasting for labs in mid April 2021, order was given to pt     FOLLOW-UP: Schedule a follow-up visit in 6 months.:.:for Well Woman Exam           Prescriptions:       [Queued Refill] Lipitor 20 mg oral tablet [1 tab daily], #90 (ninety) tablets, Refills: 1 (one)           Orders:       APPTO  Appointment need  (In-House)              Essential (primary) hypertension          Prescriptions:       [Refilled] atenoloL 50 mg oral tablet [Take 1 tablet(s) by mouth daily], #90 (ninety) tablets, Refills: 1 (one)       [Refilled] hydroCHLOROthiazide 25 mg oral tablet [one a day], #90 (ninety) tablets, Refills: 1 (one)         Type 2 diabetes mellitus without complicationsupdated diabetes flow sheet, sent for last [facility] exam [facility] / to monitor her feet daily, discussed diabetes, rx, consider referral to dietitian/nurse educator, will give her some education information on diabetes, to work on her diet and exercise, consider testing and may increase her metformin 500 mg to 2 a day        FOLLOW-UP TESTING #1: FOLLOW-UP LABORATORY:  Labs to be scheduled in the future include Diabetes Panel 2;CMP, A1C, Lipid,  Microalbumin:Creatinine Ratio.            Prescriptions:       [Refilled] metFORMIN 500 mg oral Tablet, Extended Release 24 hr [take 1 tablet (500 mg) by oral route once daily with the largest  meal], #90 (ninety) tablets, Refills: 1 (one)           Orders:       FUTURE  Future order to be done at patients convenience  (Send-Out)            [id number]  [address] CMP A1C LIPID AND MICRO ALBUM CR RATIO: [id number]  (Send-Out)                  Patient Recommendations:        For  Mixed hyperlipidemia:    Maintain a regular exercise program. Reduce the amount of cholesterol and saturated fat in your diet. Try to lose some weight; even modest weight reduction can improve your blood pressure.  Schedule a follow-up visit in 6 months.                APPOINTMENT INFORMATION:        Monday Tuesday Wednesday Thursday Friday Saturday Sunday            Time:___________________AM  PM   Date:_____________________         For  Type 2 diabetes mellitus without complications:            The following laboratory testing has been ordered:             Charge Capture:         Primary Diagnosis:     E78.2  Mixed hyperlipidemia           Orders:      [id number]  Office/outpatient visit; established patient, level 4  (In-House)            APPTO  Appointment need  (In-House)              I10  Essential (primary) hypertension     E11.9  Type 2 diabetes mellitus without complications

## 2021-05-18 NOTE — PROGRESS NOTES
Ro Conti  1955     Office/Outpatient Visit    Visit Date: Wed, Nov 27, 2019 09:11 am    Provider: Jesusita Rodriguez N.P. (Assistant: Erin Pineda MA)    Location: [facility]        Electronically signed by Jesusita Rodriguez N.P. on  11/27/2019 09:43:22 AM                             Subjective:        CC: Ms. Conti is a 64 year old White female.  This is a follow-up visit.  check up;         HPI:           Patient to be evaluated for mixed hyperlipidemia.  Most recent lab tests include Hemoglobin A1c:  6.2 (%) (04/10/2019), Glucose, Serum:  127 (mg/dL) (04/10/2019), ALT (SGPT):  40 (U/L) (04/10/2019), Systolic BP:  140 (11/27/2019), Diastolic BP:  80 (11/27/2019), Weight (lb):  249.4 (11/27/2019), Total Cholesterol:  171 (mg/dL) (04/10/2019), HDL:  40 (mg/dL) (04/10/2019), Triglycerides:  208 (mg/dL) (04/10/2019), LDL:  89 (mg/dL) (04/10/2019).  Currently, she is taking Lipitor.  Compliance with treatment has been good; she takes his medication as directed.  She denies experiencing any hypercholesterolemia related symptoms.            With regard to the essential (primary) hypertension, her current cardiac medication regimen includes a diuretic ( Hydrochlorothiazide (HCTZ) ) and a beta-blocker ( Tenormin ).  She is tolerating the medication well without side effects.  Compliance with treatment has been good; she takes her medication as directed.      ROS:     CONSTITUTIONAL:  Negative for chills, fatigue, fever, and weight change.      CARDIOVASCULAR:  Negative for chest pain, palpitations, tachycardia, orthopnea, and edema.      RESPIRATORY:  Negative for cough, dyspnea, and hemoptysis.      NEUROLOGICAL:  Negative for dizziness, headaches, paresthesias, and weakness.          Past Medical History / Family History / Social History:         Last Reviewed on 11/27/2019 09:24 AM by Jesusita Rodriguez    Past Medical History:                 PAST MEDICAL HISTORY              GYNECOLOGICAL HISTORY:        Menopause at age 50.    Last Pap was 19         PREVENTIVE HEALTH MAINTENANCE             COLORECTAL CANCER SCREENING: Up to date (colonoscopy q10y; sigmoidoscopy q5y; Cologuard q3y) was last done 3-2016, Results are in chart; colonoscopy with the following abnormalities noted-- scattered diverticulosis; The next colonoscopy is due  3-2021     Hepatitis C Medicare Screening: was last done ; negative     MAMMOGRAM: Done within last 2 years and results in are chart was last done 19 stable         Surgical History:         Cataract Removal: right; ;     : X 1;     Tubal Ligation     lasik ;     Hernia Repair: ; umbilical;     Procedures:    Colonoscopy ( 3-11-16 dr snyder, scattered divertiuclar dx )         Family History:     Father:  at age 89; Cause of death was septic meningitis;  Hypertension;  Colon Cancer; Prostate Cancer; Skin Cancer ( melanoma );  Dementia     Mother:  at age 84; Cause of death was brain bleed;  Hypertension;  Pulmonary Hypertension;  Renal Insufficency     Brother(s): 3 brother(s) total; 1 ;  Prostate Cancer;  MVA     Sister(s): 4 sister(s) total; 1 ;  Breast Cancer ( 2 sisters ); Colon Cancer     Son(s): Hernandez sarcoma     Maternal Grandmother: Coronary Artery Disease         Social History:     Occupation: Retired (Prior occupation: [employer])     Marital Status:      Children: 1 child     Hobbies/Recreation: she enjoys quilting;         Tobacco/Alcohol/Supplements:     Last Reviewed on 2019 09:11 AM by Erin Pineda    Tobacco: Current Smoker: Currently smokes cigarettes some days.          Alcohol: Frequency:    2-3 times per week; When she drinks, the average quantity of alcohol is 2-12 drinks.   She typically consumes beer.  but only socially         Substance Abuse History:     None         Immunizations:     None        Allergies:     Last  Reviewed on 11/27/2019 09:11 AM by Erin Pineda    No Known Allergies.        Current Medications:     Last Reviewed on 11/27/2019 09:11 AM by Erin Pineda    Omega 3 fatty acids 1,000mg Capsules [1000 mg 1 po QDay]    Atenolol 50mg Tablet [Take 1 tablet(s) by mouth daily]    Hydrochlorothiazide (HCTZ) 25mg Tablet [one a day]    Lipitor 20mg Tablet [1 tab daily]        Objective:        Vitals:         Current: 11/27/2019 9:15:25 AM    Ht:  5 ft, 2.375 in;  Wt: 249.4 lbs;  BMI: 45.1T: 97.9 F (oral);  BP: 140/80 mm Hg (left arm, sitting);  P: 58 bpm (left arm (BP Cuff), sitting);  sCr: 0.56 mg/dL;  GFR: 126.16        Exams:     PHYSICAL EXAM:     GENERAL: vital signs recorded - well developed, well nourished;  no apparent distress;     NECK: carotid exam reveals no bruits;     RESPIRATORY: normal respiratory rate and pattern with no distress; normal breath sounds with no rales, rhonchi, wheezes or rubs;     CARDIOVASCULAR: normal rate; rhythm is regular;  no systolic murmur; no edema;     PSYCHIATRIC:  appropriate affect and demeanor; normal speech pattern; grossly normal memory;         Assessment:         E78.2   Mixed hyperlipidemia       I10   Essential (primary) hypertension           ORDERS:         Meds Prescribed:       [Refilled] Lipitor 20 mg oral tablet [1 tab daily], #90 (ninety) tablets, Refills: 0 (zero)       [Refilled] atenolol 50 mg oral tablet [Take 1 tablet(s) by mouth daily], #90 (ninety) tablets, Refills: 1 (one)       [Refilled] hydroCHLOROthiazide 25 mg oral tablet [one a day], #90 (ninety) tablets, Refills: 1 (one)         Lab Orders:       [id number]  [facility] - [facility] CMP AND LIPID: [id number]  (Send-Out)            [id number]  [facility]1CEG - [facility] Hemoglobin A1C  (Send-Out)                      Plan:         Mixed hyperlipidemiadeclined flu vaccine    LABORATORY:  Labs ordered to be performed today include HgbA1C and HTN/Lipid Panel: CMP, Lipid.      RECOMMENDATIONS given include: exercise, low cholesterol/low  fat diet, and weight loss.      FOLLOW-UP: Schedule a follow-up visit in 6 months.            Prescriptions:       [Refilled] Lipitor 20 mg oral tablet [1 tab daily], #90 (ninety) tablets, Refills: 0 (zero)           Orders:       [id number]  [facility] - [facility] CMP AND LIPID: [id number]  (Send-Out)            [id number]  [facility] Hemoglobin A1C  (Send-Out)              Essential (primary) hypertension        RECOMMENDATIONS given include: perform routine monitoring of blood pressure with home blood pressure cuff.            Prescriptions:       [Refilled] atenolol 50 mg oral tablet [Take 1 tablet(s) by mouth daily], #90 (ninety) tablets, Refills: 1 (one)       [Refilled] hydroCHLOROthiazide 25 mg oral tablet [one a day], #90 (ninety) tablets, Refills: 1 (one)             Patient Recommendations:        For  Mixed hyperlipidemia:    Maintain a regular exercise program. Reduce the amount of cholesterol and saturated fat in your diet. Try to lose some weight; even modest weight reduction can improve your blood pressure.  Schedule a follow-up visit in 6 months.          For  Essential (primary) hypertension:    Begin monitoring your blood pressure by brief nurse visits at our office, a home blood pressure monitor, or by checking on the machines in pharmacies or stores.  Keep a log of the readings.              Charge Capture:         Primary Diagnosis:     E78.2  Mixed hyperlipidemia           Orders:      [id number]  Office/outpatient visit; established patient, level 3  (In-House)              I10  Essential (primary) hypertension

## 2021-05-18 NOTE — PROGRESS NOTES
Ro Conti 1955     Office/Outpatient Visit    Visit Date: Tue, Feb 20, 2018 08:30 am    Provider: Jesusita Rodriguez N.P. (Assistant: Jessica Horan MA)    Location: [facility]        Electronically signed by Jesusita Rodriguez N.P. on  02/20/2018 09:49:58 AM                             SUBJECTIVE:        CC:     Ms. Conti is a 63 year old White female.  WWE;         HPI:         Patient to be evaluated for well Woman Exam.  She is menopausal.  She performs breast self-exams occasionally.    Her last Pap smear was in 1-4-17 and HPV +.   Her last mammogram was in 2-1-17 and was normal.   Her last DEXA was in 8-19-08 and was normal.   Preventative Health updated today.  Ms. Conti denies any history of abnormal Pap smears.          ROS:     CONSTITUTIONAL:  Negative for chills, fatigue, fever, and weight change.      EYES:  Negative for blurred vision, eye pain, and photophobia.      E/N/T:  Negative for hearing problems, E/N/T pain, congestion, rhinorrhea, epistaxis, hoarseness, and dental problems.      CARDIOVASCULAR:  Negative for chest pain, palpitations, tachycardia, orthopnea, and edema.      RESPIRATORY:  Negative for cough, dyspnea, and hemoptysis.      GASTROINTESTINAL:  Negative for abdominal pain, heartburn, constipation, diarrhea, and stool changes.      GENITOURINARY:  Negative for genital lesions, hematuria, menstrual problems, polyuria, abnormal vaginal bleeding, and vaginal discharge.      MUSCULOSKELETAL:  Negative for arthralgias, back pain, and myalgias.      INTEGUMENTARY/BREAST:  Negative for atypical moles, dry skin, pruritis, rashes, breast masses, and nipple discharge.      NEUROLOGICAL:  Negative for dizziness, headaches, paresthesias, and weakness.      HEMATOLOGIC/LYMPHATIC:  Negative for easy bruising, bleeding, and lymphadenopathy.      ENDOCRINE:  Negative for hair loss, heat/cold intolerance, polydipsia, and polyphagia.      ALLERGIC/IMMUNOLOGIC:  Negative  for allergies, frequent illnesses, HIV exposure, and urticaria.      PSYCHIATRIC:  Negative for anxiety, depression, and sleep disturbances.          PMH/FMH/SH:     Last Reviewed on 2018 08:45 AM by Jesusita Rodriguez    Past Medical History:             GYNECOLOGICAL HISTORY:        Menopause at age 50.          PREVENTIVE HEALTH MAINTENANCE             COLORECTAL CANCER SCREENING: Up to date (colonoscopy q10y; sigmoidoscopy q5y; Cologuard q3y) was last done 3-2016, Results are in chart; colonoscopy with the following abnormalities noted-- scattered diverticulosis; The next colonoscopy is due  3-2021     Hepatitis C Medicare Screening: was last done ; negative     MAMMOGRAM: was last done 2017 stable         Surgical History:         : X 1;     Tubal Ligation Procedures: colonoscopy /normal     Hernia Repair: ; umbilical;     Procedures:    Colonoscopy ( 3-11-16 dr snyder, scattered divertiuclar dx )         Family History:     Father:  at age 89; Cause of death was septic meningitis;  Hypertension;  Colon Cancer; Prostate Cancer; Skin Cancer ( melanoma );  Dementia     Mother:  at age 84; Cause of death was brain bleed;  Hypertension;  Pulmonary Hypertension;  Renal Insufficency     Brother(s): 3 brother(s) total; 1 ;  Prostate Cancer;  MVA     Sister(s): 4 sister(s) total; 1 ;  Breast Cancer ( 2 sisters ); Colon Cancer     Son(s): Hernandez sarcoma     Maternal Grandmother: Coronary Artery Disease         Social History:     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          Alcohol: Frequency:    2-3 times per week; When she drinks, the average quantity of alcohol is  2-12 drinks.   She typically consumes beer.  but only socially         Substance Abuse History:     None             Immunizations:     None        Allergies:     Last Reviewed on 2/20/2018 08:33 AM by Jessica Horan      No Known Drug Allergies.         Current Medications:     Last Reviewed on 2/20/2018 09:11 AM by Jesusita Rodriguez    Atenolol 50mg Tablet Take 1 tablet(s) by mouth daily     Hydrochlorothiazide (HCTZ) 25mg Tablet Take  one half  tablet(s) by mouth daily     Lipitor 20mg Tablet 1 tab daily     Omega 3 fatty acids Capsules 1000 mg 1 po QDay         OBJECTIVE:        Vitals:         Current: 2/20/2018 8:33:50 AM    Ht:  5 ft, 2.375 in;  Wt: 242.8 lbs;  BMI: 43.9    T: 97 F (oral);  BP: 118/78 mm Hg (left arm, sitting);  P: 61 bpm (finger clip, sitting);  sCr: 0.64 mg/dL;  GFR: 110.49        Exams:     PHYSICAL EXAM:     GENERAL: vital signs recorded - well developed, well nourished;  no apparent distress;     EYES: extraocular movements intact; conjunctiva and cornea are normal; PERRLA;     E/N/T:  normal EACs, TMs, nasal/oral mucosa, teeth, gingiva, and oropharynx;     NECK: range of motion is normal; thyroid is non-palpable;     RESPIRATORY: normal respiratory rate and pattern with no distress; normal breath sounds with no rales, rhonchi, wheezes or rubs;     CARDIOVASCULAR: normal rate; rhythm is regular;  no systolic murmur; no edema;     GASTROINTESTINAL: nontender; normal bowel sounds; no organomegaly; rectal exam: normal tone; nontender, guaiac negative stool;     GENITOURINARY: Pap smear taken;  external genitalia: normal without lesions or urethral abnormalities;; vagina: white vaginal discharge; ;  cervix: normal appearance without lesions or discharge;;  uterus: normal size and position, well-supported;;  adnexa: normal with no masses or tenderness     LYMPHATIC: no enlargement of cervical or facial nodes; no axillary adenopathy;     BREAST/INTEGUMENT: BREASTS: breast exam is normal  without masses, skin changes, or nipple discharge; SKIN: no significant rashes or lesions; no suspicious moles;     MUSCULOSKELETAL:  Normal range of motion, strength and tone;     NEUROLOGIC: GROSSLY INTACT     PSYCHIATRIC:  appropriate affect and demeanor; normal speech pattern; grossly normal memory;         ASSESSMENT:           V72.31     Well Woman Exam     401.1   I10  Hypertension              DDx:     272.0   E78.5  Hypercholesterolemia              DDx:     V76.41   Z12.12  Screening for rectal cancer              DDx:         ORDERS:         Meds Prescribed:       Refill of: Atenolol 50mg Tablet Take 1 tablet(s) by mouth daily  #90 (Ninety) tablet(s) Refills: 1       Refill of: Hydrochlorothiazide (HCTZ) 25mg Tablet Take  one half  tablet(s) by mouth daily  #45 (Forty Five) tablet(s) Refills: 1       Refill of: Lipitor (Atorvastatin Calcium) 20mg Tablet 1 tab daily  #90 (Ninety) tablet(s) Refills: 1         Radiology/Test Orders:       [id number]  Screening mammography bi 2-view inc CAD  (Send-Out)           Lab Orders:       [id number]  Cytopathology, slides, cervical or vaginal; manual screening under MD supervision  (Send-Out)         00131  HTN - [facility] CMP AND LIPID: [id number]  (Send-Out)         64648  Occult blood, fecal  (In-House)                   PLAN:          Well Woman Exam due her next colon screen in 2021 /declines flu vaccine         COUNSELING was provided today regarding the following topics: weight loss program, low salt diet, regular exercise, and breast self-exam.      RADIOLOGY:  I have ordered screening mammogram to be done today.            Orders:      [id number]  Cytopathology, slides, cervical or vaginal; manual screening under MD supervision  (Send-Out)         23989  Screening mammography bi 2-view inc CAD  (Send-Out)             Patient Education Handouts:       Abnormal Pap Smear and Cervical Cancer           Hypertension on 1-30-18 she started a regular exercise/walking program, to  continue her efforts     LABORATORY:  Labs ordered to be performed today include HTN/Lipid Panel: CMP, Lipid.      FOLLOW-UP: Schedule a follow-up visit in 6 months.            Prescriptions:       Refill of: Atenolol 50mg Tablet Take 1 tablet(s) by mouth daily  #90 (Ninety) tablet(s) Refills: 1       Refill of: Hydrochlorothiazide (HCTZ) 25mg Tablet Take  one half  tablet(s) by mouth daily  #45 (Forty Five) tablet(s) Refills: 1           Orders:       [id number]  HTN - [facility] CMP AND LIPID: [id number]  (Send-Out)            Hypercholesterolemia           Prescriptions:       Refill of: Lipitor (Atorvastatin Calcium) 20mg Tablet 1 tab daily  #90 (Ninety) tablet(s) Refills: 1          Screening for rectal cancer           Orders:       [id number]  Occult blood, fecal  (In-House)   X 1 @ [facility]             Patient Recommendations:        For  Well Woman Exam:         Maintaining regular physical activity is advised to help prevent heart disease, hypertension, diabetes, and obesity.    You should regularly examine your breasts, easily done while in the shower or with lotion.  Feel and look for differences in consistency from month to month, especially noting knots or lumps, changes in skin appearance, nipple retraction or discharge.          For  Hypertension:     Schedule a follow-up visit in 6 months.              CHARGE CAPTURE:           Primary Diagnosis:     V72.31    Well Woman Exam                Z01.419    Encounter for gynecological examination (general) (routine) without abnormal findings                       Orders:          [id number]   Preventive medicine, established patient, age 40-64 years  (In-House)           401.1 Hypertension            I10    Essential (primary) hypertension    272.0 Hypercholesterolemia            E78.5    Hyperlipidemia, unspecified    V76.41 Screening for rectal cancer            Z12.12    Encounter for screening for malignant neoplasm of rectum              Orders:          [id number]   Occult  blood, fecal  (In-House)               ADDENDUMS:      ____________________________________    Addendum: 02/20/2018 11:43 Ro Beck        Please add [phone] handling fee.

## 2021-05-18 NOTE — PROGRESS NOTES
Ro Conti  1955     Office/Outpatient Visit    Visit Date: Mon, Karl 15, 2020 01:56 pm    Provider: Jesusita Rodriguez N.P. (Assistant: Sherri Garcia MA)    Location: [facility]        Electronically signed by Jesusita Rodriguez N.P. on  06/15/2020 03:06:21 PM                             Subjective:        CC: Ms. Conti is a 65 year old White female.  Patient is here today for a Well Woman exam.; needs some refills and had recent labs         HPI:           Patient to be evaluated for encounter for gynecological examination (general) (routine) without abnormal findings.  Her last physical exam was 1 year ago.  She is menopausal.   Her last Pap smear was in 4-11-19 and was normal.   Her last mammogram was in 4-19-19 and was normal.   Her last DEXA was in 8-19-08 and was normal.   Preventative Health updated today.  Ms. Conti denies any history of abnormal Pap smears.            PHQ-9 Depression Screening: Completed form scanned and in chart; Total Score 0     ROS:     CONSTITUTIONAL:  Negative for chills and fever.      EYES:  Negative for blurred vision.      E/N/T:  Negative for diminished hearing and nasal congestion.      CARDIOVASCULAR:  Negative for chest pain and palpitations.      RESPIRATORY:  Negative for recent cough and dyspnea.      GASTROINTESTINAL:  Positive for acid reflux symptoms ( TUMS helps ) and constipation ( with two doses of metformin a day ).   Negative for abdominal pain, melena, nausea or vomiting.      GENITOURINARY:  Negative for dysuria.      MUSCULOSKELETAL:  Negative for arthralgias and myalgias.      INTEGUMENTARY/BREAST:  Negative for atypical mole(s), rash and breast mass.      NEUROLOGICAL:  Negative for paresthesias and weakness.      PSYCHIATRIC:  Negative for anxiety and depression.          Past Medical History / Family History / Social History:         Last Reviewed on 6/15/2020 02:27 PM by Jesusita Rodriguez    Past Medical History:                  PAST MEDICAL HISTORY             GYNECOLOGICAL HISTORY:        Menopause at age 50.    Last Pap was 19         PREVENTIVE HEALTH MAINTENANCE             COLORECTAL CANCER SCREENING: Up to date (colonoscopy q10y; sigmoidoscopy q5y; Cologuard q3y) was last done 3-2016, Results are in chart; colonoscopy with the following abnormalities noted-- scattered diverticulosis; The next colonoscopy is due  3-2021     Hepatitis C Medicare Screening: was last done ; negative     MAMMOGRAM: Done within last 2 years and results in are chart was last done 19 stable         Surgical History:         Cataract Removal: right; ;     : X 1;     Tubal Ligation     lasik ;     Hernia Repair: ; umbilical;     Procedures:    Colonoscopy ( 3-11-16 dr snyder, scattered divertiuclar dx )         Family History:     Father:  at age 89; Cause of death was septic meningitis;  Hypertension;  Colon Cancer; Prostate Cancer; Skin Cancer ( melanoma );  Dementia     Mother:  at age 84; Cause of death was brain bleed;  Hypertension;  Pulmonary Hypertension;  Renal Insufficency     Brother(s): 3 brother(s) total; 1 ;  Prostate Cancer;  MVA     Sister(s): 4 sister(s) total; 1 ;  Breast Cancer ( 2 sisters ); Colon Cancer     Son(s): Hernandez sarcoma     Maternal Grandmother: Coronary Artery Disease         Social History:     Occupation: Retired (Prior occupation: [employer]). working part time at [employer] office;     Marital Status:      Children: 1 child     Hobbies/Recreation: she enjoys quilting;         Tobacco/Alcohol/Supplements:     Last Reviewed on 6/15/2020 01:59 PM by Sherri Garcia    Tobacco: Current Smoker: Currently smokes cigarettes some days.          Alcohol: Frequency:    2-3 times per week; When she drinks, the average quantity of alcohol is 2-12 drinks.   She typically consumes beer.  but only socially         Substance  Abuse History:     None         Immunizations:     None        Allergies:     Last Reviewed on 6/15/2020 01:58 PM by Sherri Garcia    No Known Allergies.        Current Medications:     Last Reviewed on 6/15/2020 01:58 PM by Sherri Garcia    omega-3 fatty acids 1,000 mg oral capsule [1000 mg 1 po QDay]    atenolol 50 mg oral tablet [Take 1 tablet(s) by mouth daily]    hydroCHLOROthiazide 25 mg oral tablet [one a day]    Lipitor 20 mg oral tablet [1 tab daily]    metFORMIN 500 mg oral Tablet, Extended Release 24 hr [take 1 tablet (500 mg) by oral route twice daily]not taking daily or if she does not two a day         Objective:        Vitals:         Current: 6/15/2020 2:03:03 PM    Ht:  5 ft, 2.375 in;  Wt: 252.8 lbs;  BMI: 45.7T: 97.8 F (temporal);  BP: 134/75 mm Hg (left arm, sitting);  P: 55 bpm (left arm (BP Cuff), sitting);  sCr: 0.7 mg/dL;  GFR: 100.25        Exams:     PHYSICAL EXAM:     GENERAL: vital signs recorded - well developed, well nourished;  no apparent distress;     EYES: extraocular movements intact; conjunctiva and cornea are normal; PERRL;     E/N/T:  normal EACs, TMs, nasal/oral mucosa, teeth, gingiva, and oropharynx;     NECK: range of motion is normal; thyroid is non-palpable;     RESPIRATORY: normal respiratory rate and pattern with no distress; normal breath sounds with no rales, rhonchi, wheezes or rubs;     CARDIOVASCULAR: normal rate; rhythm is regular;  no systolic murmur; no edema;     GASTROINTESTINAL: nontender; normal bowel sounds; no organomegaly; rectal exam: normal tone; nontender, guaiac negative stool;     GENITOURINARY: external genitalia: normal without lesions or urethral abnormalities;; vagina: atrophic mucosa; ; ;  adnexa: normal with no masses or tenderness     LYMPHATIC: no enlargement of cervical or facial nodes; no axillary adenopathy;     BREAST/INTEGUMENT: BREASTS: breast exam is normal without masses, skin changes, or nipple discharge; SKIN: no significant  rashes or lesions; no suspicious moles;     MUSCULOSKELETAL:  Normal range of motion, strength and tone;     NEUROLOGIC: GROSSLY INTACT     PSYCHIATRIC: appropriate affect and demeanor; normal psychomotor function;         Assessment:         Z01.419   Encounter for gynecological examination (general) (routine) without abnormal findings       Z13.31   Encounter for screening for depression       E11.9   Type 2 diabetes mellitus without complications       Z12.12   Encounter for screening for malignant neoplasm of rectum       E78.2   Mixed hyperlipidemia       I10   Essential (primary) hypertension           ORDERS:         Meds Prescribed:       [Refilled] Lipitor 20 mg oral tablet [1 tab daily], #90 (ninety) tablets, Refills: 1 (one)       [Refilled] atenolol 50 mg oral tablet [Take 1 tablet(s) by mouth daily], #90 (ninety) tablets, Refills: 1 (one)       [Refilled] hydroCHLOROthiazide 25 mg oral tablet [one a day], #90 (ninety) tablets, Refills: 1 (one)       [Refilled] metFORMIN 500 mg oral Tablet, Extended Release 24 hr [take 1 tablet (500 mg) by oral route once daily with the largest  meal], #90 (ninety) tablets, Refills: 0 (zero)         Radiology/Test Orders:       [id number]  Screening digital breast tomosynthesis bi  (Send-Out)              Lab Orders:       FUTURE  Future order to be done at patients convenience  (Send-Out)            [id number]  [facility] Basic Metabolic Panel  (Send-Out)            [facility]  ([facility]            63904  Occult blood, fecal  (In-House)              Other Orders:         Depression screen negative  (In-House)                      Plan:         Encounter for gynecological examination (general) (routine) without abnormal findingspap deferred, reviewed last pap         RADIOLOGY:  I have ordered Mammogram Bilateral Screening 3D to be done today.      COUNSELING was provided today regarding the following topics: healthy eating habits, regular exercise, and  breast self-exam.      FOLLOW-UP: Schedule a follow-up visit in 12 months.            Orders:       [id number]  Screening digital breast tomosynthesis bi  (Send-Out)              Encounter for screening for depression    MIPS PHQ-9 Depression Screening: Completed form scanned and in chart; Total Score 0; Negative Depression Screen           Orders:         Depression screen negative  (In-House)              Type 2 diabetes mellitus without complicationscopy of labs given to pt, discussed labs / okay to take just one metformin a day with her largest meal        FOLLOW-UP TESTING #1: FOLLOW-UP LABORATORY:  Labs to be scheduled in the future include BMP and HgbA1C.      RECOMMENDATIONS: discussed rx, diet, exercise and weight loss as well on T2DM.      FOLLOW-UP: fasting for labs in 3 months, order given to pt           Prescriptions:       [Refilled] metFORMIN 500 mg oral Tablet, Extended Release 24 hr [take 1 tablet (500 mg) by oral route once daily with the largest  meal], #90 (ninety) tablets, Refills: 0 (zero)           Orders:       FUTURE  Future order to be done at patients convenience  (Send-Out)            09084  BMP - [facility] Basic Metabolic Panel  (Send-Out)            037[facility]  [facility]  ([facility])              Encounter for screening for malignant neoplasm of rectum          Orders:       [id number]  Occult blood, fecal  (In-House)      X 1 @ [facility]        Mixed hyperlipidemia          Prescriptions:       [Refilled] Lipitor 20 mg oral tablet [1 tab daily], #90 (ninety) tablets, Refills: 1 (one)         Essential (primary) hypertension          Prescriptions:       [Refilled] atenolol 50 mg oral tablet [Take 1 tablet(s) by mouth daily], #90 (ninety) tablets, Refills: 1 (one)       [Refilled] hydroCHLOROthiazide 25 mg oral tablet [one a day], #90 (ninety) tablets, Refills: 1 (one)             Patient Recommendations:        For  Encounter for gynecological examination (general) (routine) without  abnormal findings:        Limit dietary intake of fat (especially saturated fat) and cholesterol.  Eat a variety of foods, including plenty of fruits, vegetables, and grain containg fiber, limit fat intake to 30% of total calories. Balance caloric intake with energy expended.    Maintaining regular physical activity is advised to help prevent heart disease, hypertension, diabetes, and obesity.    You should regularly examine your breasts, easily done while in the shower or with lotion.  Feel and look for differences in consistency from month to month, especially noting knots or lumps, changes in skin appearance, nipple retraction or discharge.  Schedule a follow-up visit in 12 months.          For  Type 2 diabetes mellitus without complications:            The following laboratory testing has been ordered: metabolic panel, basic HgbA1C             Charge Capture:         Primary Diagnosis:     Z01.419  Encounter for gynecological examination (general) (routine) without abnormal findings     Z13.31  Encounter for screening for depression           Orders:        Depression screen negative  (In-House)              E11.9  Type 2 diabetes mellitus without complications     Z12.12  Encounter for screening for malignant neoplasm of rectum           Orders:      [id number]  Occult blood, fecal  (In-House)              E78.2  Mixed hyperlipidemia     I10  Essential (primary) hypertension         ADDENDUMS:      ____________________________________    Addendum: 06/17/2020 09:53 AM - Jesusita Rodriguez        Add [id number]

## 2021-06-25 ENCOUNTER — TRANSCRIBE ORDERS (OUTPATIENT)
Dept: ADMINISTRATIVE | Facility: HOSPITAL | Age: 66
End: 2021-06-25

## 2021-06-25 DIAGNOSIS — Z12.31 VISIT FOR SCREENING MAMMOGRAM: Primary | ICD-10-CM

## 2021-07-01 VITALS
DIASTOLIC BLOOD PRESSURE: 78 MMHG | TEMPERATURE: 97 F | BODY MASS INDEX: 44.68 KG/M2 | SYSTOLIC BLOOD PRESSURE: 118 MMHG | HEART RATE: 61 BPM | HEIGHT: 62 IN | WEIGHT: 242.8 LBS

## 2021-07-01 VITALS
TEMPERATURE: 98.3 F | BODY MASS INDEX: 46.33 KG/M2 | SYSTOLIC BLOOD PRESSURE: 144 MMHG | HEART RATE: 61 BPM | HEIGHT: 62 IN | DIASTOLIC BLOOD PRESSURE: 92 MMHG | WEIGHT: 251.8 LBS

## 2021-07-01 VITALS
HEIGHT: 62 IN | DIASTOLIC BLOOD PRESSURE: 86 MMHG | WEIGHT: 238.4 LBS | TEMPERATURE: 98.1 F | HEART RATE: 53 BPM | SYSTOLIC BLOOD PRESSURE: 136 MMHG | BODY MASS INDEX: 43.87 KG/M2

## 2021-07-01 VITALS
DIASTOLIC BLOOD PRESSURE: 80 MMHG | BODY MASS INDEX: 45.9 KG/M2 | HEART RATE: 58 BPM | TEMPERATURE: 97.9 F | HEIGHT: 62 IN | WEIGHT: 249.4 LBS | SYSTOLIC BLOOD PRESSURE: 140 MMHG

## 2021-07-02 VITALS
TEMPERATURE: 97.8 F | HEART RATE: 55 BPM | HEIGHT: 62 IN | BODY MASS INDEX: 46.52 KG/M2 | DIASTOLIC BLOOD PRESSURE: 75 MMHG | SYSTOLIC BLOOD PRESSURE: 134 MMHG | WEIGHT: 252.8 LBS

## 2021-07-02 VITALS
DIASTOLIC BLOOD PRESSURE: 71 MMHG | SYSTOLIC BLOOD PRESSURE: 134 MMHG | WEIGHT: 246 LBS | HEIGHT: 62 IN | TEMPERATURE: 96.3 F | HEART RATE: 55 BPM | BODY MASS INDEX: 45.27 KG/M2

## 2021-07-02 VITALS
HEIGHT: 62 IN | WEIGHT: 254.2 LBS | HEART RATE: 54 BPM | DIASTOLIC BLOOD PRESSURE: 75 MMHG | BODY MASS INDEX: 46.78 KG/M2 | SYSTOLIC BLOOD PRESSURE: 140 MMHG | TEMPERATURE: 96.7 F

## 2021-08-04 ENCOUNTER — HOSPITAL ENCOUNTER (OUTPATIENT)
Dept: MAMMOGRAPHY | Facility: HOSPITAL | Age: 66
Discharge: HOME | End: 2021-08-04
Admitting: NURSE PRACTITIONER

## 2021-08-04 DIAGNOSIS — Z12.31 VISIT FOR SCREENING MAMMOGRAM: ICD-10-CM

## 2021-08-04 PROCEDURE — 77063 BREAST TOMOSYNTHESIS BI: CPT

## 2021-08-04 PROCEDURE — 77067 SCR MAMMO BI INCL CAD: CPT

## 2021-08-04 PROCEDURE — 77063 BREAST TOMOSYNTHESIS BI: CPT | Performed by: RADIOLOGY

## 2021-08-04 PROCEDURE — 77067 SCR MAMMO BI INCL CAD: CPT | Performed by: RADIOLOGY

## 2021-09-01 ENCOUNTER — OFFICE VISIT (OUTPATIENT)
Dept: FAMILY MEDICINE CLINIC | Age: 66
End: 2021-09-01

## 2021-09-01 VITALS
SYSTOLIC BLOOD PRESSURE: 121 MMHG | BODY MASS INDEX: 44.64 KG/M2 | WEIGHT: 247 LBS | DIASTOLIC BLOOD PRESSURE: 58 MMHG | HEART RATE: 58 BPM

## 2021-09-01 DIAGNOSIS — Z80.0 FAMILY HISTORY OF COLON CANCER: ICD-10-CM

## 2021-09-01 DIAGNOSIS — Z12.31 ENCOUNTER FOR SCREENING MAMMOGRAM FOR MALIGNANT NEOPLASM OF BREAST: Primary | ICD-10-CM

## 2021-09-01 DIAGNOSIS — E11.9 TYPE 2 DIABETES MELLITUS WITHOUT COMPLICATION, WITHOUT LONG-TERM CURRENT USE OF INSULIN (HCC): ICD-10-CM

## 2021-09-01 DIAGNOSIS — Z01.419 ENCOUNTER FOR ANNUAL ROUTINE GYNECOLOGICAL EXAMINATION: ICD-10-CM

## 2021-09-01 PROCEDURE — G0123 SCREEN CERV/VAG THIN LAYER: HCPCS | Performed by: NURSE PRACTITIONER

## 2021-09-01 PROCEDURE — 99396 PREV VISIT EST AGE 40-64: CPT | Performed by: NURSE PRACTITIONER

## 2021-09-01 RX ORDER — HYDROCHLOROTHIAZIDE 25 MG/1
25 TABLET ORAL DAILY
COMMUNITY
Start: 2021-06-10 | End: 2022-03-03 | Stop reason: SDUPTHER

## 2021-09-01 RX ORDER — METFORMIN HYDROCHLORIDE 500 MG/1
500 TABLET, EXTENDED RELEASE ORAL DAILY
COMMUNITY
Start: 2021-06-10 | End: 2022-03-08

## 2021-09-01 RX ORDER — ATENOLOL 50 MG/1
50 TABLET ORAL DAILY
COMMUNITY
Start: 2021-06-10 | End: 2022-03-03 | Stop reason: SDUPTHER

## 2021-09-01 RX ORDER — ATORVASTATIN CALCIUM 20 MG/1
20 TABLET, FILM COATED ORAL DAILY
COMMUNITY
Start: 2021-06-10 | End: 2022-03-03 | Stop reason: SDUPTHER

## 2021-09-01 NOTE — ASSESSMENT & PLAN NOTE
Advise her to work on diet, regular exercise, weight loss, start checking sugars, getting labs in , reviewed last labs

## 2021-09-06 LAB
CYTOLOGIST CVX/VAG CYTO: NORMAL
CYTOLOGY CVX/VAG DOC CYTO: NORMAL
DX ICD CODE: NORMAL
HIV 1 & 2 AB SER-IMP: NORMAL
Lab: NORMAL
OTHER STN SPEC: NORMAL
STAT OF ADQ CVX/VAG CYTO-IMP: NORMAL

## 2021-11-11 DIAGNOSIS — E11.9 TYPE 2 DIABETES MELLITUS WITHOUT COMPLICATION, WITHOUT LONG-TERM CURRENT USE OF INSULIN (HCC): Primary | ICD-10-CM

## 2021-11-23 ENCOUNTER — LAB (OUTPATIENT)
Dept: LAB | Facility: HOSPITAL | Age: 66
End: 2021-11-23

## 2021-11-23 DIAGNOSIS — E11.9 TYPE 2 DIABETES MELLITUS WITHOUT COMPLICATION, WITHOUT LONG-TERM CURRENT USE OF INSULIN (HCC): ICD-10-CM

## 2021-11-23 LAB
ALBUMIN SERPL-MCNC: 4.6 G/DL (ref 3.5–5.2)
ALBUMIN/GLOB SERPL: 1.7 G/DL
ALP SERPL-CCNC: 68 U/L (ref 39–117)
ALT SERPL W P-5'-P-CCNC: 39 U/L (ref 1–33)
ANION GAP SERPL CALCULATED.3IONS-SCNC: 7.6 MMOL/L (ref 5–15)
AST SERPL-CCNC: 34 U/L (ref 1–32)
BILIRUB SERPL-MCNC: 0.4 MG/DL (ref 0–1.2)
BUN SERPL-MCNC: 13 MG/DL (ref 8–23)
BUN/CREAT SERPL: 18.8 (ref 7–25)
CALCIUM SPEC-SCNC: 10.1 MG/DL (ref 8.6–10.5)
CHLORIDE SERPL-SCNC: 101 MMOL/L (ref 98–107)
CHOLEST SERPL-MCNC: 173 MG/DL (ref 0–200)
CO2 SERPL-SCNC: 30.4 MMOL/L (ref 22–29)
CREAT SERPL-MCNC: 0.69 MG/DL (ref 0.57–1)
GFR SERPL CREATININE-BSD FRML MDRD: 86 ML/MIN/1.73
GLOBULIN UR ELPH-MCNC: 2.7 GM/DL
GLUCOSE SERPL-MCNC: 158 MG/DL (ref 65–99)
HBA1C MFR BLD: 7.73 % (ref 4.8–5.6)
HDLC SERPL-MCNC: 34 MG/DL (ref 40–60)
LDLC SERPL CALC-MCNC: 104 MG/DL (ref 0–100)
LDLC/HDLC SERPL: 2.92 {RATIO}
POTASSIUM SERPL-SCNC: 4.1 MMOL/L (ref 3.5–5.2)
PROT SERPL-MCNC: 7.3 G/DL (ref 6–8.5)
SODIUM SERPL-SCNC: 139 MMOL/L (ref 136–145)
TRIGL SERPL-MCNC: 198 MG/DL (ref 0–150)
VLDLC SERPL-MCNC: 35 MG/DL (ref 5–40)

## 2021-11-23 PROCEDURE — 83036 HEMOGLOBIN GLYCOSYLATED A1C: CPT

## 2021-11-23 PROCEDURE — 80053 COMPREHEN METABOLIC PANEL: CPT

## 2021-11-23 PROCEDURE — 80061 LIPID PANEL: CPT

## 2021-11-23 PROCEDURE — 36415 COLL VENOUS BLD VENIPUNCTURE: CPT

## 2022-03-03 ENCOUNTER — LAB (OUTPATIENT)
Dept: LAB | Facility: HOSPITAL | Age: 67
End: 2022-03-03

## 2022-03-03 ENCOUNTER — OFFICE VISIT (OUTPATIENT)
Dept: FAMILY MEDICINE CLINIC | Age: 67
End: 2022-03-03

## 2022-03-03 VITALS
BODY MASS INDEX: 44.64 KG/M2 | WEIGHT: 247 LBS | HEART RATE: 64 BPM | SYSTOLIC BLOOD PRESSURE: 118 MMHG | DIASTOLIC BLOOD PRESSURE: 69 MMHG

## 2022-03-03 DIAGNOSIS — E11.9 TYPE 2 DIABETES MELLITUS WITHOUT COMPLICATION, WITHOUT LONG-TERM CURRENT USE OF INSULIN: Primary | ICD-10-CM

## 2022-03-03 DIAGNOSIS — Z80.0 FAMILY HISTORY OF COLON CANCER: ICD-10-CM

## 2022-03-03 DIAGNOSIS — I10 ESSENTIAL HYPERTENSION: ICD-10-CM

## 2022-03-03 DIAGNOSIS — E78.2 MIXED HYPERLIPIDEMIA: ICD-10-CM

## 2022-03-03 LAB
ALBUMIN SERPL-MCNC: 4.5 G/DL (ref 3.5–5.2)
ALBUMIN/GLOB SERPL: 1.7 G/DL
ALP SERPL-CCNC: 78 U/L (ref 39–117)
ALT SERPL W P-5'-P-CCNC: 47 U/L (ref 1–33)
ANION GAP SERPL CALCULATED.3IONS-SCNC: 11.6 MMOL/L (ref 5–15)
AST SERPL-CCNC: 44 U/L (ref 1–32)
BILIRUB SERPL-MCNC: 0.5 MG/DL (ref 0–1.2)
BUN SERPL-MCNC: 8 MG/DL (ref 8–23)
BUN/CREAT SERPL: 12.5 (ref 7–25)
CALCIUM SPEC-SCNC: 9.8 MG/DL (ref 8.6–10.5)
CHLORIDE SERPL-SCNC: 101 MMOL/L (ref 98–107)
CHOLEST SERPL-MCNC: 157 MG/DL (ref 0–200)
CO2 SERPL-SCNC: 28.4 MMOL/L (ref 22–29)
CREAT SERPL-MCNC: 0.64 MG/DL (ref 0.57–1)
EGFRCR SERPLBLD CKD-EPI 2021: 98.2 ML/MIN/1.73
GLOBULIN UR ELPH-MCNC: 2.7 GM/DL
GLUCOSE SERPL-MCNC: 123 MG/DL (ref 65–99)
HDLC SERPL-MCNC: 32 MG/DL (ref 40–60)
LDLC SERPL CALC-MCNC: 90 MG/DL (ref 0–100)
LDLC/HDLC SERPL: 2.63 {RATIO}
POTASSIUM SERPL-SCNC: 3.7 MMOL/L (ref 3.5–5.2)
PROT SERPL-MCNC: 7.2 G/DL (ref 6–8.5)
SODIUM SERPL-SCNC: 141 MMOL/L (ref 136–145)
TRIGL SERPL-MCNC: 204 MG/DL (ref 0–150)
VLDLC SERPL-MCNC: 35 MG/DL (ref 5–40)

## 2022-03-03 PROCEDURE — 80061 LIPID PANEL: CPT | Performed by: NURSE PRACTITIONER

## 2022-03-03 PROCEDURE — 80053 COMPREHEN METABOLIC PANEL: CPT | Performed by: NURSE PRACTITIONER

## 2022-03-03 PROCEDURE — 99214 OFFICE O/P EST MOD 30 MIN: CPT | Performed by: NURSE PRACTITIONER

## 2022-03-03 PROCEDURE — 36415 COLL VENOUS BLD VENIPUNCTURE: CPT | Performed by: NURSE PRACTITIONER

## 2022-03-03 RX ORDER — ATORVASTATIN CALCIUM 20 MG/1
20 TABLET, FILM COATED ORAL DAILY
Qty: 90 TABLET | Refills: 1 | Status: SHIPPED | OUTPATIENT
Start: 2022-03-03 | End: 2022-08-30 | Stop reason: SDUPTHER

## 2022-03-03 RX ORDER — ATENOLOL 50 MG/1
50 TABLET ORAL DAILY
Qty: 90 TABLET | Refills: 1 | Status: SHIPPED | OUTPATIENT
Start: 2022-03-03 | End: 2022-08-30 | Stop reason: SDUPTHER

## 2022-03-03 RX ORDER — HYDROCHLOROTHIAZIDE 25 MG/1
25 TABLET ORAL DAILY
Qty: 90 TABLET | Refills: 1 | Status: SHIPPED | OUTPATIENT
Start: 2022-03-03 | End: 2022-08-30 | Stop reason: SDUPTHER

## 2022-03-03 NOTE — PROGRESS NOTES
Ro Conti presents to [facility].    Chief Complaint:  Diabetes (declines flu vaccine), Hypertension, and Hyperlipidemia         History of Present Illness:  Diabetes:  Current medication Metformin    Tolerating medication: Yes, but when she took 2 she was constipated (taking metamucil)   Last eye exam 8-2021  Last foot exam 3-2021  At home BS ranges: not checking   Lab Results       Component                Value               Date                       HGBA1C                   7.73 (H)            11/23/2021                Hyperlipidemia  Current medication Lipitor   Tolerating medication: Yes  Needs Refill: Yes    Lab Results       Component                Value               Date                       CHOL                     173                 11/23/2021                 CHLPL                    157                 05/07/2021                 TRIG                     198 (H)             11/23/2021                 HDL                      34 (L)              11/23/2021                 LDL                      104 (H)             11/23/2021                Hypertension:  Current medication : atenolol, HCTZ   Tolerating Medication: Yes  Checking BP at home and it is not checking   Needs refills: Yes  Labs   Lab Results       Component                Value               Date                       GLUCOSE                  158 (H)             11/23/2021                 BUN                      13                  11/23/2021                 CREATININE               0.69                11/23/2021                 EGFRIFNONA               86                  11/23/2021                 BCR                      18.8                11/23/2021                 K                        4.1                 11/23/2021                 CO2                      30.4 (H)            11/23/2021                 CALCIUM                  10.1                11/23/2021                 ALBUMIN                   4.60                2021                 LABIL2                   1.7                 2021                 AST                      34 (H)              2021                 ALT                      39 (H)              2021                PAST MEDICAL HISTORY no changes other than a colonoscopy in             GYNECOLOGICAL HISTORY:        Menopause at age 50.    Last Pap was 19         PREVENTIVE HEALTH MAINTENANCE             COLORECTAL CANCER SCREENING: Up to date (colonoscopy q10y; sigmoidoscopy q5y; Cologuard q3y) was last done 3-2016, Results are in chart; colonoscopy with the following abnormalities noted-- scattered diverticulosis;  10-8-21 tubular adenoma and hyperplastic polyp The next colonoscopy is due       Hepatitis C Medicare Screening: was last done ; negative     MAMMOGRAM: Done within last 2 years and results in are chart was last done 20 stable         Surgical History:         Cataract Removal: right; ;     : X 1;     Tubal Ligation     lasik ;     Hernia Repair: ; umbilical;     Procedures:    Colonoscopy ( 3-11-16 dr snyder, scattered divertiuclar dx )         Family History:     Father:  at age 89; Cause of death was septic meningitis;  Hypertension;  Colon Cancer; Prostate Cancer; Skin Cancer ( melanoma );  Dementia     Mother:  at age 84; Cause of death was brain bleed;  Hypertension;  Pulmonary Hypertension;  Renal Insufficency     Brother(s): 3 brother(s) total; 1 ;  Prostate Cancer;  MVA     Sister(s): 4 sister(s) total; 1 ;  Breast Cancer ( 2 sisters ); Colon Cancer     Son(s): Hernandez sarcoma     Maternal Grandmother: Coronary Artery Disease         Social History:     Occupation: Retired (Prior occupation: [employer]). working part time at [employer] office;     Marital Status:      Children: 1 child       Review of Systems:  Review of  Systems   Constitutional: Negative for fatigue and fever.   Respiratory: Negative for cough and shortness of breath.    Cardiovascular: Negative for chest pain, palpitations and leg swelling.   Neurological: Negative for numbness.          Current Outpatient Medications:   •  atenolol (TENORMIN) 50 MG tablet, Take 1 tablet by mouth Daily., Disp: 90 tablet, Rfl: 1  •  atorvastatin (LIPITOR) 20 MG tablet, Take 1 tablet by mouth Daily., Disp: 90 tablet, Rfl: 1  •  hydroCHLOROthiazide (HYDRODIURIL) 25 MG tablet, Take 1 tablet by mouth Daily., Disp: 90 tablet, Rfl: 1  •  metFORMIN ER (GLUCOPHAGE-XR) 500 MG 24 hr tablet, Take 500 mg by mouth Daily., Disp: , Rfl:     Vital Signs:   Vitals:    03/03/22 1309   BP: 118/69   BP Location: Left arm   Patient Position: Sitting   Pulse: 64   Weight: 112 kg (247 lb)         Physical Exam:  Physical Exam  Constitutional:       Appearance: Normal appearance.   Neck:      Vascular: No carotid bruit.   Cardiovascular:      Rate and Rhythm: Normal rate and regular rhythm.      Pulses:           Posterior tibial pulses are 2+ on the right side and 2+ on the left side.      Heart sounds: No murmur heard.      Pulmonary:      Effort: No respiratory distress.      Breath sounds: Normal breath sounds.   Musculoskeletal:         General: No swelling.   Feet:      Right foot:      Protective Sensation: 3 sites tested. 3 sites sensed.      Skin integrity: Skin integrity normal. No ulcer or blister.      Toenail Condition: Right toenails are abnormally thick.      Left foot:      Protective Sensation: 3 sites tested. 3 sites sensed.      Skin integrity: Skin integrity normal. No ulcer or blister.      Toenail Condition: Left toenails are normal.      Comments: Diabetic Foot Exam Performed and Monofilament Test Performed   right great toe nail thickened slightly   Neurological:      Mental Status: She is alert.   Psychiatric:         Mood and Affect: Mood normal.         Thought Content: Thought  content normal.         Result Review      The following data was reviewed by: JEROME Villela on 03/03/2022:    Results for orders placed or performed in visit on 11/23/21   Comprehensive Metabolic Panel    Specimen: Blood   Result Value Ref Range    Glucose 158 (H) 65 - 99 mg/dL    BUN 13 8 - 23 mg/dL    Creatinine 0.69 0.57 - 1.00 mg/dL    Sodium 139 136 - 145 mmol/L    Potassium 4.1 3.5 - 5.2 mmol/L    Chloride 101 98 - 107 mmol/L    CO2 30.4 (H) 22.0 - 29.0 mmol/L    Calcium 10.1 8.6 - 10.5 mg/dL    Total Protein 7.3 6.0 - 8.5 g/dL    Albumin 4.60 3.50 - 5.20 g/dL    ALT (SGPT) 39 (H) 1 - 33 U/L    AST (SGOT) 34 (H) 1 - 32 U/L    Alkaline Phosphatase 68 39 - 117 U/L    Total Bilirubin 0.4 0.0 - 1.2 mg/dL    eGFR Non African Amer 86 >60 mL/min/1.73    Globulin 2.7 gm/dL    A/G Ratio 1.7 g/dL    BUN/Creatinine Ratio 18.8 7.0 - 25.0    Anion Gap 7.6 5.0 - 15.0 mmol/L   Hemoglobin A1c    Specimen: Blood   Result Value Ref Range    Hemoglobin A1C 7.73 (H) 4.80 - 5.60 %   Lipid Panel    Specimen: Blood   Result Value Ref Range    Total Cholesterol 173 0 - 200 mg/dL    Triglycerides 198 (H) 0 - 150 mg/dL    HDL Cholesterol 34 (L) 40 - 60 mg/dL    LDL Cholesterol  104 (H) 0 - 100 mg/dL    VLDL Cholesterol 35 5 - 40 mg/dL    LDL/HDL Ratio 2.92                Assessment and Plan:          Diagnoses and all orders for this visit:    1. Type 2 diabetes mellitus without complication, without long-term current use of insulin (HCC) (Primary)  Assessment & Plan:  To work on diet, regular exercise, monitor sugars, check feet daily, see optometrist yearly or as directed.  Will check labs before sending refill, she did not tolerate BID dosing of  Metformin, she is not checking sugars, works on diet and exercise  She is fasting today     Orders:  -     Comprehensive Metabolic Panel  -     Lipid Panel  -     Hemoglobin A1c    2. Essential hypertension  Assessment & Plan:  Hypertension is stable.  to monitor BP at home.  Continue current meds. Continue to modify diet and lifestyle. Will need labs every 6 months and follow up.       Orders:  -     Comprehensive Metabolic Panel  -     Lipid Panel  -     atenolol (TENORMIN) 50 MG tablet; Take 1 tablet by mouth Daily.  Dispense: 90 tablet; Refill: 1  -     hydroCHLOROthiazide (HYDRODIURIL) 25 MG tablet; Take 1 tablet by mouth Daily.  Dispense: 90 tablet; Refill: 1    3. Mixed hyperlipidemia  Assessment & Plan:  Continue current medication and efforts with diet and exercise.       Orders:  -     Comprehensive Metabolic Panel  -     Lipid Panel  -     atorvastatin (LIPITOR) 20 MG tablet; Take 1 tablet by mouth Daily.  Dispense: 90 tablet; Refill: 1    4. Family history of colon cancer  Assessment & Plan:  Reviewed her pathology from colonoscopy and when due repeat screening           Follow Up   Return for followup pending lab results.  Patient was given instructions and counseling regarding her condition or for health maintenance advice. Please see specific information pulled into the AVS if appropriate.

## 2022-03-03 NOTE — ASSESSMENT & PLAN NOTE
Hypertension is stable.  to monitor BP at home. Continue current meds. Continue to modify diet and lifestyle. Will need labs every 6 months and follow up.

## 2022-03-03 NOTE — ASSESSMENT & PLAN NOTE
To work on diet, regular exercise, monitor sugars, check feet daily, see optometrist yearly or as directed.  Will check labs before sending refill, she did not tolerate BID dosing of  Metformin, she is not checking sugars, works on diet and exercise  She is fasting today

## 2022-03-08 RX ORDER — METFORMIN HYDROCHLORIDE 500 MG/1
TABLET, EXTENDED RELEASE ORAL
Qty: 90 TABLET | Refills: 0 | Status: SHIPPED | OUTPATIENT
Start: 2022-03-08 | End: 2022-06-03 | Stop reason: SDUPTHER

## 2022-03-10 ENCOUNTER — CLINICAL SUPPORT (OUTPATIENT)
Dept: FAMILY MEDICINE CLINIC | Age: 67
End: 2022-03-10

## 2022-03-10 DIAGNOSIS — E11.9 TYPE 2 DIABETES MELLITUS WITHOUT COMPLICATION, WITHOUT LONG-TERM CURRENT USE OF INSULIN: Primary | ICD-10-CM

## 2022-03-10 LAB
EXPIRATION DATE: NORMAL
HBA1C MFR BLD: 9.3 %
Lab: NORMAL

## 2022-03-10 PROCEDURE — 3046F HEMOGLOBIN A1C LEVEL >9.0%: CPT | Performed by: NURSE PRACTITIONER

## 2022-03-10 PROCEDURE — 83036 HEMOGLOBIN GLYCOSYLATED A1C: CPT | Performed by: NURSE PRACTITIONER

## 2022-03-11 DIAGNOSIS — E11.9 TYPE 2 DIABETES MELLITUS WITHOUT COMPLICATION, WITHOUT LONG-TERM CURRENT USE OF INSULIN: Primary | ICD-10-CM

## 2022-03-11 DIAGNOSIS — I10 ESSENTIAL HYPERTENSION: ICD-10-CM

## 2022-05-13 ENCOUNTER — LAB (OUTPATIENT)
Dept: LAB | Facility: HOSPITAL | Age: 67
End: 2022-05-13

## 2022-05-13 DIAGNOSIS — E11.9 TYPE 2 DIABETES MELLITUS WITHOUT COMPLICATION, WITHOUT LONG-TERM CURRENT USE OF INSULIN: ICD-10-CM

## 2022-05-13 DIAGNOSIS — I10 ESSENTIAL HYPERTENSION: ICD-10-CM

## 2022-05-13 LAB
ALBUMIN SERPL-MCNC: 4.4 G/DL (ref 3.5–5.2)
ALBUMIN/GLOB SERPL: 1.8 G/DL
ALP SERPL-CCNC: 56 U/L (ref 39–117)
ALT SERPL W P-5'-P-CCNC: 38 U/L (ref 1–33)
ANION GAP SERPL CALCULATED.3IONS-SCNC: 10.5 MMOL/L (ref 5–15)
AST SERPL-CCNC: 47 U/L (ref 1–32)
BILIRUB SERPL-MCNC: 0.5 MG/DL (ref 0–1.2)
BUN SERPL-MCNC: 11 MG/DL (ref 8–23)
BUN/CREAT SERPL: 16.4 (ref 7–25)
CALCIUM SPEC-SCNC: 9.8 MG/DL (ref 8.6–10.5)
CHLORIDE SERPL-SCNC: 102 MMOL/L (ref 98–107)
CO2 SERPL-SCNC: 23.5 MMOL/L (ref 22–29)
CREAT SERPL-MCNC: 0.67 MG/DL (ref 0.57–1)
EGFRCR SERPLBLD CKD-EPI 2021: 97.1 ML/MIN/1.73
GLOBULIN UR ELPH-MCNC: 2.5 GM/DL
GLUCOSE SERPL-MCNC: 116 MG/DL (ref 65–99)
HBA1C MFR BLD: 7.6 % (ref 4.8–5.6)
POTASSIUM SERPL-SCNC: 3.9 MMOL/L (ref 3.5–5.2)
PROT SERPL-MCNC: 6.9 G/DL (ref 6–8.5)
SODIUM SERPL-SCNC: 136 MMOL/L (ref 136–145)

## 2022-05-13 PROCEDURE — 36415 COLL VENOUS BLD VENIPUNCTURE: CPT | Performed by: NURSE PRACTITIONER

## 2022-05-13 PROCEDURE — 80053 COMPREHEN METABOLIC PANEL: CPT

## 2022-05-13 PROCEDURE — 83036 HEMOGLOBIN GLYCOSYLATED A1C: CPT | Performed by: NURSE PRACTITIONER

## 2022-06-03 DIAGNOSIS — E11.9 TYPE 2 DIABETES MELLITUS WITHOUT COMPLICATION, WITHOUT LONG-TERM CURRENT USE OF INSULIN: ICD-10-CM

## 2022-06-03 NOTE — TELEPHONE ENCOUNTER
Caller: Ro Conti    Relationship: Self    Best call back number: [phone]    Requested Prescriptions:   Requested Prescriptions     Pending Prescriptions Disp Refills   • metFORMIN ER (GLUCOPHAGE-XR) 500 MG 24 hr tablet 90 tablet 0   • SITagliptin (Januvia) 50 MG tablet 30 tablet 2     Sig: Take 1 tablet by mouth Daily.        Pharmacy where request should be sent: [facility] [address] [place] [address] - [facility] JOANNE YOON  - [phone] [facility] [phone] FX     Additional details provided by patient: PATIENT STATES SHE WOULD LIKE AN ALTERNATIVE TO JANUVIA BECAUSE IT IS VERY EXPENSIVE. PATIENT STATES TO PLEASE CALL TO INFORM HER WHEN THE REFILLS ARE SENT IN.    Does the patient have less than a 3 day supply:  [] Yes  [x] No    John PAZ Rep   06/03/22 08:44 EDT

## 2022-06-06 RX ORDER — METFORMIN HYDROCHLORIDE 500 MG/1
TABLET, EXTENDED RELEASE ORAL
Qty: 90 TABLET | Refills: 0 | Status: SHIPPED | OUTPATIENT
Start: 2022-06-06 | End: 2022-08-30 | Stop reason: SDUPTHER

## 2022-06-06 NOTE — TELEPHONE ENCOUNTER
Pt requesting Januvia only be sent to mail order, pt to call back with which mail order medication needs to go to, states it is cheaper for 90 days mail order than 30 days to local pharmacy.

## 2022-07-13 ENCOUNTER — TRANSCRIBE ORDERS (OUTPATIENT)
Dept: ADMINISTRATIVE | Facility: HOSPITAL | Age: 67
End: 2022-07-13

## 2022-07-13 DIAGNOSIS — Z12.31 VISIT FOR SCREENING MAMMOGRAM: Primary | ICD-10-CM

## 2022-08-17 ENCOUNTER — HOSPITAL ENCOUNTER (OUTPATIENT)
Dept: MAMMOGRAPHY | Facility: HOSPITAL | Age: 67
Discharge: HOME | End: 2022-08-17
Admitting: NURSE PRACTITIONER

## 2022-08-17 DIAGNOSIS — Z12.31 VISIT FOR SCREENING MAMMOGRAM: ICD-10-CM

## 2022-08-17 PROCEDURE — 77063 BREAST TOMOSYNTHESIS BI: CPT

## 2022-08-17 PROCEDURE — 77067 SCR MAMMO BI INCL CAD: CPT

## 2022-08-30 ENCOUNTER — OFFICE VISIT (OUTPATIENT)
Dept: FAMILY MEDICINE CLINIC | Age: 67
End: 2022-08-30

## 2022-08-30 ENCOUNTER — LAB (OUTPATIENT)
Dept: LAB | Facility: HOSPITAL | Age: 67
End: 2022-08-30

## 2022-08-30 VITALS
TEMPERATURE: 98.5 F | HEART RATE: 55 BPM | OXYGEN SATURATION: 95 % | WEIGHT: 245.4 LBS | DIASTOLIC BLOOD PRESSURE: 81 MMHG | BODY MASS INDEX: 45.16 KG/M2 | HEIGHT: 62 IN | SYSTOLIC BLOOD PRESSURE: 115 MMHG

## 2022-08-30 DIAGNOSIS — I10 ESSENTIAL HYPERTENSION: ICD-10-CM

## 2022-08-30 DIAGNOSIS — Z00.00 ROUTINE GENERAL MEDICAL EXAMINATION AT A HEALTH CARE FACILITY: Primary | ICD-10-CM

## 2022-08-30 DIAGNOSIS — E11.9 TYPE 2 DIABETES MELLITUS WITHOUT COMPLICATION, WITHOUT LONG-TERM CURRENT USE OF INSULIN: ICD-10-CM

## 2022-08-30 DIAGNOSIS — Z78.0 POSTMENOPAUSAL: ICD-10-CM

## 2022-08-30 DIAGNOSIS — E78.2 MIXED HYPERLIPIDEMIA: ICD-10-CM

## 2022-08-30 DIAGNOSIS — Z23 IMMUNIZATION DUE: ICD-10-CM

## 2022-08-30 LAB
ALBUMIN SERPL-MCNC: 4.3 G/DL (ref 3.5–5.2)
ALBUMIN UR-MCNC: <1.2 MG/DL
ALBUMIN/GLOB SERPL: 2 G/DL
ALP SERPL-CCNC: 59 U/L (ref 39–117)
ALT SERPL W P-5'-P-CCNC: 30 U/L (ref 1–33)
ANION GAP SERPL CALCULATED.3IONS-SCNC: 9.2 MMOL/L (ref 5–15)
AST SERPL-CCNC: 36 U/L (ref 1–32)
BILIRUB SERPL-MCNC: 0.6 MG/DL (ref 0–1.2)
BUN SERPL-MCNC: 10 MG/DL (ref 8–23)
BUN/CREAT SERPL: 13.7 (ref 7–25)
CALCIUM SPEC-SCNC: 9.8 MG/DL (ref 8.6–10.5)
CHLORIDE SERPL-SCNC: 103 MMOL/L (ref 98–107)
CHOLEST SERPL-MCNC: 161 MG/DL (ref 0–200)
CO2 SERPL-SCNC: 29.8 MMOL/L (ref 22–29)
CREAT SERPL-MCNC: 0.73 MG/DL (ref 0.57–1)
CREAT UR-MCNC: 75.6 MG/DL
EGFRCR SERPLBLD CKD-EPI 2021: 91.4 ML/MIN/1.73
GLOBULIN UR ELPH-MCNC: 2.1 GM/DL
GLUCOSE SERPL-MCNC: 144 MG/DL (ref 65–99)
HBA1C MFR BLD: 7.1 % (ref 4.8–5.6)
HDLC SERPL-MCNC: 35 MG/DL (ref 40–60)
LDLC SERPL CALC-MCNC: 94 MG/DL (ref 0–100)
LDLC/HDLC SERPL: 2.56 {RATIO}
MICROALBUMIN/CREAT UR: NORMAL MG/G{CREAT}
POTASSIUM SERPL-SCNC: 4.1 MMOL/L (ref 3.5–5.2)
PROT SERPL-MCNC: 6.4 G/DL (ref 6–8.5)
SODIUM SERPL-SCNC: 142 MMOL/L (ref 136–145)
TRIGL SERPL-MCNC: 182 MG/DL (ref 0–150)
VLDLC SERPL-MCNC: 32 MG/DL (ref 5–40)

## 2022-08-30 PROCEDURE — 1159F MED LIST DOCD IN RCRD: CPT | Performed by: NURSE PRACTITIONER

## 2022-08-30 PROCEDURE — 36415 COLL VENOUS BLD VENIPUNCTURE: CPT

## 2022-08-30 PROCEDURE — 80053 COMPREHEN METABOLIC PANEL: CPT

## 2022-08-30 PROCEDURE — 1170F FXNL STATUS ASSESSED: CPT | Performed by: NURSE PRACTITIONER

## 2022-08-30 PROCEDURE — 82043 UR ALBUMIN QUANTITATIVE: CPT

## 2022-08-30 PROCEDURE — G0009 ADMIN PNEUMOCOCCAL VACCINE: HCPCS | Performed by: NURSE PRACTITIONER

## 2022-08-30 PROCEDURE — 83036 HEMOGLOBIN GLYCOSYLATED A1C: CPT

## 2022-08-30 PROCEDURE — 96160 PT-FOCUSED HLTH RISK ASSMT: CPT | Performed by: NURSE PRACTITIONER

## 2022-08-30 PROCEDURE — G0438 PPPS, INITIAL VISIT: HCPCS | Performed by: NURSE PRACTITIONER

## 2022-08-30 PROCEDURE — 82570 ASSAY OF URINE CREATININE: CPT

## 2022-08-30 PROCEDURE — 90677 PCV20 VACCINE IM: CPT | Performed by: NURSE PRACTITIONER

## 2022-08-30 PROCEDURE — 80061 LIPID PANEL: CPT

## 2022-08-30 RX ORDER — HYDROCHLOROTHIAZIDE 25 MG/1
25 TABLET ORAL DAILY
Qty: 90 TABLET | Refills: 1 | Status: SHIPPED | OUTPATIENT
Start: 2022-08-30 | End: 2023-02-20 | Stop reason: SDUPTHER

## 2022-08-30 RX ORDER — ATENOLOL 50 MG/1
50 TABLET ORAL DAILY
Qty: 90 TABLET | Refills: 1 | Status: SHIPPED | OUTPATIENT
Start: 2022-08-30 | End: 2023-02-20 | Stop reason: SDUPTHER

## 2022-08-30 RX ORDER — METFORMIN HYDROCHLORIDE 500 MG/1
TABLET, EXTENDED RELEASE ORAL
Qty: 90 TABLET | Refills: 0 | Status: SHIPPED | OUTPATIENT
Start: 2022-08-30 | End: 2022-11-22 | Stop reason: SDUPTHER

## 2022-08-30 RX ORDER — ATORVASTATIN CALCIUM 20 MG/1
20 TABLET, FILM COATED ORAL DAILY
Qty: 90 TABLET | Refills: 1 | Status: SHIPPED | OUTPATIENT
Start: 2022-08-30 | End: 2023-02-20 | Stop reason: SDUPTHER

## 2022-08-30 RX ORDER — CHLORAL HYDRATE 500 MG
CAPSULE ORAL
COMMUNITY

## 2022-08-30 NOTE — ASSESSMENT & PLAN NOTE
Advise regular exercise, healthy eating, always wear seat belts. Living will discussed and reviewed our copy, fall prevention discussed.  Immunizations discussed.   To continue yearly optometry and dental exams.    Get tdap, shingrex ,flu and covid booster  Do monthly BSE

## 2022-08-30 NOTE — ASSESSMENT & PLAN NOTE
Discussion of her diet, exercise and weight loss. Glucose Meter order and supplies rx given; she has seen her opthalmology this month, monitor her feet, reviewed her labs, cut back on beer and quit smoking

## 2022-08-30 NOTE — PROGRESS NOTES
The ABCs of the Annual Wellness Visit  Subsequent Medicare Wellness Visit    Chief Complaint   Patient presents with   • Medicare Wellness-subsequent   • Foot Injury     (L) top of foot. Pt states this happened 6 months ago, pain is gone but her great toe and second toe on (L) foot is numb.       Subjective    History of Present Illness:  Ro Conti is a 67 y.o. female who presents for a Subsequent Medicare Wellness Visit.    Medicare wellness HPI  Exercises regularly:yes, usually daily   Eats healthy: yes   Last mammogram:22 benign  Last DEXA: normal   Last pap smear: normal   BSE:occ   Wears seatbelts: yes   Living will: yes   Optometrist:Andrey Perez 22  Dentist: [facility] [place]   Tobacco: smokes when she drinks beer   Alcohol intake: weekly   Drugs: none   Falls:no   Colonoscopy:    At [facility]   Immunizations: 3 covid vaccines, over 10 years ago for Tdap, no shingles      Diabetes:  Current medication: metformin, januvia   Tolerating medication: Yes  Last eye exam: 22  Last foot exam: 3-2022  At home BS ranges: not checking   Lab Results   Component Value Date    HGBA1C 7.60 (H) 2022       Hyperlipidemia  Current medication: lipitor   Tolerating medication: Yes  Needs Refill: Yes    Lab Results   Component Value Date    CHOL 157 2022    CHLPL 157 2021    TRIG 204 (H) 2022    HDL 32 (L) 2022    LDL 90 2022           PAST MEDICAL HISTORY no changes other than a colonoscopy in 3-2022            GYNECOLOGICAL HISTORY:        Menopause at age 50.    Last Pap was 19         PREVENTIVE HEALTH MAINTENANCE             COLORECTAL CANCER SCREENING: Up to date (colonoscopy q10y; sigmoidoscopy q5y; Cologuard q3y) was last done 3-2016, Results are in chart; colonoscopy with the following abnormalities noted-- scattered diverticulosis;  10-8-21 tubular adenoma and hyperplastic polyp The next colonoscopy is due        Hepatitis C Medicare Screening: was last done ; negative             Surgical History:         Cataract Removal: right; ;     : X 1;     Tubal Ligation     lasik ;     Hernia Repair: ; umbilical;     Procedures:    Colonoscopy ( 3-11-16 dr snyder, maicol mccartytiakiraar dx )         Family History:     Father:  at age 89; Cause of death was septic meningitis;  Hypertension;  Colon Cancer; Prostate Cancer; Skin Cancer ( melanoma );  Dementia     Mother:  at age 84; Cause of death was brain bleed;  Hypertension;  Pulmonary Hypertension;  Renal Insufficency     Brother(s): 3 brother(s) total; 1 ;  Prostate Cancer;  MVA     Sister(s): 4 sister(s) total; 1 ;  Breast Cancer ( 2 sisters ); Colon Cancer     Son(s): Hernandez sarcoma     Maternal Grandmother: Coronary Artery Disease         Social History:     Occupation: Retired (Prior occupation: [employer]). working part time at [employer] office;     Marital Status:      Children: 1 child     The following portions of the patient's history were reviewed and   updated as appropriate: allergies, current medications, past family history, past medical history, past social history, past surgical history and problem list.    Compared to one year ago, the patient feels her physical   health is better.    Compared to one year ago, the patient feels her mental   health is the same.    Recent Hospitalizations:  She was not admitted to the hospital during the last year.       Current Medical Providers:  Patient Care Team:  Jesusita Rodriguez APRN as PCP - General (Family Medicine)    Outpatient Medications Prior to Visit   Medication Sig Dispense Refill   • Omega-3 Fatty Acids (fish oil) 1000 MG capsule capsule Take  by mouth Daily With Breakfast.     • SITagliptin (Januvia) 50 MG tablet Take 1 tablet by mouth Daily. 90 tablet 1   • atenolol (TENORMIN) 50 MG tablet Take 1 tablet by mouth  Daily. 90 tablet 1   • atorvastatin (LIPITOR) 20 MG tablet Take 1 tablet by mouth Daily. 90 tablet 1   • hydroCHLOROthiazide (HYDRODIURIL) 25 MG tablet Take 1 tablet by mouth Daily. 90 tablet 1   • metFORMIN ER (GLUCOPHAGE-XR) 500 MG 24 hr tablet TAKE ONE TABLET BY MOUTH DAILY WITH THE LARGEST MEAL 90 tablet 0     No facility-administered medications prior to visit.       No opioid medication identified on active medication list. I have reviewed chart for other potential  high risk medication/s and harmful drug interactions in the elderly.          Aspirin is not on active medication list.  Aspirin use is not indicated based on review of current medical condition/s. Risk of harm outweighs potential benefits.  .    Patient Active Problem List   Diagnosis   • Family history of colon cancer   • Encounter for screening mammogram for malignant neoplasm of breast   • Encounter for annual routine gynecological examination   • Type 2 diabetes mellitus without complication (HCC)   • Essential hypertension   • Hyperlipidemia   • Routine general medical examination at a health care facility   • Postmenopausal   • Immunization due     Advance Care Planning  Advance Directive is on file.  ACP discussion was held with the patient during this visit. Patient has an advance directive in EMR which is still valid.     Review of Systems   Constitutional: Negative for fatigue and fever.   HENT: Negative for sore throat.    Eyes: Negative for visual disturbance.   Respiratory: Negative for cough and shortness of breath.    Cardiovascular: Negative for chest pain, palpitations and leg swelling.   Gastrointestinal: Negative for abdominal pain.   Genitourinary: Negative for difficulty urinating.   Musculoskeletal: Negative for arthralgias.   Skin: Negative for rash.   Hematological: Negative for adenopathy.   Psychiatric/Behavioral: Negative for sleep disturbance.        Objective    Vitals:    08/30/22 0821   BP: 115/81   BP Location: Left  "arm   Patient Position: Sitting   Cuff Size: Adult   Pulse: 55   Temp: 98.5 °F (36.9 °C)   TempSrc: Oral   SpO2: 95%  Comment: room air   Weight: 111 kg (245 lb 6.4 oz)   Height: 158.4 cm (62.36\")     Estimated body mass index is 44.36 kg/m² as calculated from the following:    Height as of this encounter: 158.4 cm (62.36\").    Weight as of this encounter: 111 kg (245 lb 6.4 oz).    Class 3 Severe Obesity (BMI >=40). Obesity-related health conditions include the following: hypertension and diabetes mellitus. Obesity is improving with lifestyle modifications. BMI is is above average; BMI management plan is completed. We discussed portion control and increasing exercise.      Does the patient have evidence of cognitive impairment? No    Physical Exam  Vitals reviewed.   Constitutional:       Appearance: Normal appearance. She is well-developed.   HENT:      Head: Normocephalic and atraumatic.      Right Ear: External ear normal.      Left Ear: External ear normal.      Mouth/Throat:      Pharynx: No oropharyngeal exudate.   Eyes:      Conjunctiva/sclera: Conjunctivae normal.      Pupils: Pupils are equal, round, and reactive to light.   Cardiovascular:      Rate and Rhythm: Normal rate and regular rhythm.      Heart sounds: No murmur heard.    No friction rub. No gallop.   Pulmonary:      Effort: Pulmonary effort is normal.      Breath sounds: Normal breath sounds. No wheezing or rhonchi.   Chest:   Breasts:      Right: Normal. No mass, nipple discharge, skin change or axillary adenopathy.      Left: Normal. No mass, nipple discharge, skin change or axillary adenopathy.       Abdominal:      General: Bowel sounds are normal. There is no distension.      Palpations: Abdomen is soft.      Tenderness: There is no abdominal tenderness.   Lymphadenopathy:      Upper Body:      Right upper body: No axillary adenopathy.      Left upper body: No axillary adenopathy.   Skin:     General: Skin is warm and dry.   Neurological: "      Mental Status: She is alert and oriented to person, place, and time.      Cranial Nerves: No cranial nerve deficit.   Psychiatric:         Mood and Affect: Mood and affect normal.         Behavior: Behavior normal.         Thought Content: Thought content normal.         Judgment: Judgment normal.                 HEALTH RISK ASSESSMENT    Smoking Status:  Social History     Tobacco Use   Smoking Status Current Some Day Smoker   • Packs/day: 0.25   • Years: 30.00   • Pack years: 7.50   • Types: Cigarettes   Smokeless Tobacco Never Used     Alcohol Consumption:  Social History     Substance and Sexual Activity   Alcohol Use Yes    Comment: 2-3 TIMES PER WK QUANTITY OF ALCOHOL IS 2-12 DRINKS TYPICALLY BEER BUT ONLY SOCIALLY     Fall Risk Screen:    HERBIEADI Fall Risk Assessment was completed, and patient is at LOW risk for falls.Assessment completed on:8/30/2022    Depression Screening:  PHQ-2/PHQ-9 Depression Screening 8/30/2022   Retired PHQ-9 Total Score -   Retired Total Score -   Little Interest or Pleasure in Doing Things 0-->not at all   Feeling Down, Depressed or Hopeless 0-->not at all   PHQ-9: Brief Depression Severity Measure Score 0       Health Habits and Functional and Cognitive Screening:  Functional & Cognitive Status 8/30/2022   Do you have difficulty preparing food and eating? No   Do you have difficulty bathing yourself, getting dressed or grooming yourself? No   Do you have difficulty using the toilet? No   Do you have difficulty moving around from place to place? No   Do you have trouble with steps or getting out of a bed or a chair? No   Current Diet Well Balanced Diet   Dental Exam Up to date   Eye Exam Up to date   Exercise (times per week) 5 times per week   Current Exercises Include Walking   Do you need help using the phone?  No   Are you deaf or do you have serious difficulty hearing?  No   Do you need help with transportation? No   Do you need help shopping? No   Do you need help  preparing meals?  No   Do you need help with housework?  No   Do you need help with laundry? No   Do you need help taking your medications? No   Do you need help managing money? No   Do you ever drive or ride in a car without wearing a seat belt? No   Have you felt unusual stress, anger or loneliness in the last month? No   Who do you live with? Alone   If you need help, do you have trouble finding someone available to you? No   Have you been bothered in the last four weeks by sexual problems? No   Do you have difficulty concentrating, remembering or making decisions? No       Age-appropriate Screening Schedule:  Refer to the list below for future screening recommendations based on patient's age, sex and/or medical conditions. Orders for these recommended tests are listed in the plan section. The patient has been provided with a written plan.    Health Maintenance   Topic Date Due   • TDAP/TD VACCINES (2 - Tdap) 07/01/2006   • ZOSTER VACCINE (1 of 2) Never done   • DXA SCAN  08/19/2010   • URINE MICROALBUMIN  05/07/2022   • INFLUENZA VACCINE  10/01/2022   • HEMOGLOBIN A1C  11/13/2022   • DIABETIC FOOT EXAM  03/03/2023   • LIPID PANEL  03/03/2023   • DIABETIC EYE EXAM  08/16/2023   • MAMMOGRAM  08/17/2024              Assessment & Plan   CMS Preventative Services Quick Reference  Risk Factors Identified During Encounter  Immunizations Discussed/Encouraged (specific Immunizations; Tdap, Influenza, Prevnar 20 (Pneumococcal 20-valent conjugate), Shingrix and COVID19  The above risks/problems have been discussed with the patient.  Follow up actions/plans if indicated are seen below in the Assessment/Plan Section.  Pertinent information has been shared with the patient in the After Visit Summary.    Diagnoses and all orders for this visit:    1. Routine general medical examination at a health care facility (Primary)  Assessment & Plan:  Advise regular exercise, healthy eating, always wear seat belts. Living will discussed  and reviewed our copy, fall prevention discussed.  Immunizations discussed.   To continue yearly optometry and dental exams.    Get tdap, shingrex ,flu and covid booster  Do monthly BSE       2. Postmenopausal  Assessment & Plan:  Reviewed last dexa in 2008    Orders:  -     DEXA Bone Density Axial; Future    3. Type 2 diabetes mellitus without complication, without long-term current use of insulin (HCC)  Assessment & Plan:  Discussion of her diet, exercise and weight loss. Glucose Meter order and supplies rx given; she has seen her opthalmology this month, monitor her feet, reviewed her labs, cut back on beer and quit smoking     Orders:  -     Comprehensive Metabolic Panel; Future  -     Lipid Panel; Future  -     Hemoglobin A1c; Future  -     Microalbumin / Creatinine Urine Ratio - Urine, Clean Catch; Future  -     metFORMIN ER (GLUCOPHAGE-XR) 500 MG 24 hr tablet; TAKE ONE TABLET BY MOUTH DAILY WITH THE LARGEST MEAL  Dispense: 90 tablet; Refill: 0    4. Essential hypertension  Assessment & Plan:  Hypertension is stable. Continue to monitor BP at home. Continue current meds. Continue to modify diet and lifestyle. Will need labs every 6 months and follow up.       Orders:  -     atenolol (TENORMIN) 50 MG tablet; Take 1 tablet by mouth Daily.  Dispense: 90 tablet; Refill: 1  -     hydroCHLOROthiazide (HYDRODIURIL) 25 MG tablet; Take 1 tablet by mouth Daily.  Dispense: 90 tablet; Refill: 1    5. Mixed hyperlipidemia  Assessment & Plan:  Continue current medication and efforts with diet and exercise.       Orders:  -     atorvastatin (LIPITOR) 20 MG tablet; Take 1 tablet by mouth Daily.  Dispense: 90 tablet; Refill: 1    6. Immunization due  Assessment & Plan:  pneu 20 today, she is considering her covid booster next month, then flu and will check on her shingrex and tdap coverage at her pharmacy     Orders:  -     Pneumococcal Conjugate Vaccine 20-Valent (PCV20)      Follow Up:   Return for followup pending lab  results.     An After Visit Summary and PPPS were made available to the patient.

## 2022-08-30 NOTE — ASSESSMENT & PLAN NOTE
pneu 20 today, she is considering her covid booster next month, then flu and will check on her shingrex and tdap coverage at her pharmacy

## 2022-09-06 ENCOUNTER — HOSPITAL ENCOUNTER (OUTPATIENT)
Dept: BONE DENSITY | Facility: HOSPITAL | Age: 67
Discharge: HOME | End: 2022-09-06
Admitting: NURSE PRACTITIONER

## 2022-09-06 DIAGNOSIS — Z78.0 POSTMENOPAUSAL: ICD-10-CM

## 2022-09-06 PROCEDURE — 77080 DXA BONE DENSITY AXIAL: CPT

## 2022-11-22 DIAGNOSIS — E11.9 TYPE 2 DIABETES MELLITUS WITHOUT COMPLICATION, WITHOUT LONG-TERM CURRENT USE OF INSULIN: ICD-10-CM

## 2022-11-22 RX ORDER — METFORMIN HYDROCHLORIDE 500 MG/1
TABLET, EXTENDED RELEASE ORAL
Qty: 90 TABLET | Refills: 1 | Status: SHIPPED | OUTPATIENT
Start: 2022-11-22 | End: 2023-02-22 | Stop reason: DRUGHIGH

## 2023-02-20 ENCOUNTER — OFFICE VISIT (OUTPATIENT)
Dept: FAMILY MEDICINE CLINIC | Age: 68
End: 2023-02-20
Payer: MEDICARE

## 2023-02-20 ENCOUNTER — TELEPHONE (OUTPATIENT)
Dept: FAMILY MEDICINE CLINIC | Age: 68
End: 2023-02-20

## 2023-02-20 ENCOUNTER — LAB (OUTPATIENT)
Dept: LAB | Facility: HOSPITAL | Age: 68
End: 2023-02-20
Payer: MEDICARE

## 2023-02-20 VITALS
WEIGHT: 246.2 LBS | BODY MASS INDEX: 45.3 KG/M2 | HEART RATE: 56 BPM | HEIGHT: 62 IN | SYSTOLIC BLOOD PRESSURE: 133 MMHG | RESPIRATION RATE: 16 BRPM | DIASTOLIC BLOOD PRESSURE: 70 MMHG

## 2023-02-20 DIAGNOSIS — Z80.0 FAMILY HISTORY OF COLON CANCER: ICD-10-CM

## 2023-02-20 DIAGNOSIS — I10 ESSENTIAL HYPERTENSION: ICD-10-CM

## 2023-02-20 DIAGNOSIS — E78.2 MIXED HYPERLIPIDEMIA: ICD-10-CM

## 2023-02-20 DIAGNOSIS — I10 ESSENTIAL HYPERTENSION: Primary | ICD-10-CM

## 2023-02-20 DIAGNOSIS — E11.9 TYPE 2 DIABETES MELLITUS WITHOUT COMPLICATION, WITHOUT LONG-TERM CURRENT USE OF INSULIN: ICD-10-CM

## 2023-02-20 LAB — HBA1C MFR BLD: 7.1 % (ref 4.8–5.6)

## 2023-02-20 PROCEDURE — 80053 COMPREHEN METABOLIC PANEL: CPT

## 2023-02-20 PROCEDURE — 83036 HEMOGLOBIN GLYCOSYLATED A1C: CPT

## 2023-02-20 PROCEDURE — 36415 COLL VENOUS BLD VENIPUNCTURE: CPT

## 2023-02-20 PROCEDURE — 99214 OFFICE O/P EST MOD 30 MIN: CPT | Performed by: NURSE PRACTITIONER

## 2023-02-20 PROCEDURE — 80061 LIPID PANEL: CPT

## 2023-02-20 RX ORDER — ATENOLOL 50 MG/1
50 TABLET ORAL DAILY
Qty: 90 TABLET | Refills: 1 | Status: SHIPPED | OUTPATIENT
Start: 2023-02-20

## 2023-02-20 RX ORDER — ATORVASTATIN CALCIUM 20 MG/1
20 TABLET, FILM COATED ORAL DAILY
Qty: 90 TABLET | Refills: 1 | Status: SHIPPED | OUTPATIENT
Start: 2023-02-20

## 2023-02-20 RX ORDER — HYDROCHLOROTHIAZIDE 25 MG/1
25 TABLET ORAL DAILY
Qty: 90 TABLET | Refills: 1 | Status: SHIPPED | OUTPATIENT
Start: 2023-02-20

## 2023-02-20 NOTE — ASSESSMENT & PLAN NOTE
To work on diet, regular exercise, monitor sugars, check feet daily, see optometrist yearly or as directed.  After labs I will increase her metformin  mg, and will stop her januvia

## 2023-02-20 NOTE — ASSESSMENT & PLAN NOTE
Hypertension is stable. Continue to monitor BP at home. Continue current meds. Continue to modify diet and lifestyle.

## 2023-02-20 NOTE — PROGRESS NOTES
Ro Conti presents to [facility].    Chief Complaint:  Diabetes, Hyperlipidemia, Hypertension, and Follow-up         History of Present Illness:  Hyperlipidemia  Current medication: lipitor   Tolerating medication: Yes  Needs Refill: Yes to [facility]     Lab Results       Component                Value               Date                       CHOL                     161                 08/30/2022                 CHLPL                    157                 05/07/2021                 TRIG                     182 (H)             08/30/2022                 HDL                      35 (L)              08/30/2022                 LDL                      94                  08/30/2022              Hypertension:  Current medication: atenolol HCTZ   Tolerating Medication: Yes  Checking BP at home and it is: not checking   Needs refills: Yes, [facility]   Labs:  Lab Results       Component                Value               Date                       GLUCOSE                  144 (H)             08/30/2022                 BUN                      10                  08/30/2022                 CREATININE               0.73                08/30/2022                 EGFRIFNONA               86                  11/23/2021                 BCR                      13.7                08/30/2022                 K                        4.1                 08/30/2022                 CO2                      29.8 (H)            08/30/2022                 CALCIUM                  9.8                 08/30/2022                 ALBUMIN                  4.30                08/30/2022                 LABIL2                   1.7                 05/07/2021                 AST                      36 (H)              08/30/2022                 ALT                      30                  08/30/2022              Diabetes:  Current medication: januvia, metformin  one daily  Tolerating medication:  yes, but januvia is too expensive ($60 every three months vs $3.90 for metformin) had constipation when she tried 2 metformin a day in the past   Last eye exam:   Last foot exam: 3-2022  At home BS ranges: 114-181  Lab Results       Component                Value               Date                       HGBA1C                   7.10 (H)            2022              PAST MEDICAL HISTORY no changes since             GYNECOLOGICAL HISTORY:        Menopause at age 50.    Last Pap was 19         PREVENTIVE HEALTH MAINTENANCE           COLORECTAL CANCER SCREENING: Up to date (colonoscopy q10y; sigmoidoscopy q5y; Cologuard q3y) was last done 3-2022; 3-2016, Results are in chart; colonoscopy with the following abnormalities noted-- scattered diverticulosis;  10-8-21 tubular adenoma and hyperplastic polyp The next colonoscopy is due       Hepatitis C Medicare Screening: was last done ; negative             Surgical History:         Cataract Removal: right; ;     : X 1;     Tubal Ligation     lasik ;     Hernia Repair: ; umbilical;     Procedures:    Colonoscopy ( 3-11-16 dr snyder, scattered divertiuclar dx ) & 3-2022        Family History:     Father:  at age 89; Cause of death was septic meningitis;  Hypertension;  Colon Cancer; Prostate Cancer; Skin Cancer ( melanoma );  Dementia     Mother:  at age 84; Cause of death was brain bleed;  Hypertension;  Pulmonary Hypertension;  Renal Insufficency     Brother(s): 3 brother(s) total; 1 ;  Prostate Cancer;  MVA     Sister(s): 4 sister(s) total; 1 ;  Breast Cancer ( 2 sisters ); Colon Cancer     Son(s): Hernandez sarcoma     Maternal Grandmother: Coronary Artery Disease         Social History:     Occupation: Retired (Prior occupation: [employer]). working part time at [employer] office;     Marital Status:      Children: 1 child            Review of  "Systems:  Review of Systems   Constitutional: Negative for fatigue and fever.   Respiratory: Negative for cough and shortness of breath.    Cardiovascular: Negative for chest pain, palpitations and leg swelling.   Neurological: Negative for numbness.          Current Outpatient Medications:   •  Accu-Chek Softclix Lancets lancets, Use as instructed to test blood sugar once daily, Disp: 100 each, Rfl: 3  •  atenolol (TENORMIN) 50 MG tablet, Take 1 tablet by mouth Daily., Disp: 90 tablet, Rfl: 1  •  atorvastatin (LIPITOR) 20 MG tablet, Take 1 tablet by mouth Daily., Disp: 90 tablet, Rfl: 1  •  Blood Glucose Monitoring Suppl (Accu-Chek Judi Plus) w/Device kit, Use as instructed to test blood sugar once daily, Disp: 1 kit, Rfl: 0  •  glucose blood (Accu-Chek Judi Plus) test strip, Use as instructed to test blood sugar once daily, Disp: 100 each, Rfl: 3  •  hydroCHLOROthiazide (HYDRODIURIL) 25 MG tablet, Take 1 tablet by mouth Daily., Disp: 90 tablet, Rfl: 1  •  metFORMIN ER (GLUCOPHAGE-XR) 500 MG 24 hr tablet, TAKE ONE TABLET BY MOUTH DAILY WITH THE LARGEST MEAL, Disp: 90 tablet, Rfl: 1  •  Omega-3 Fatty Acids (fish oil) 1000 MG capsule capsule, Take  by mouth Daily With Breakfast., Disp: , Rfl:     Vital Signs:   Vitals:    02/20/23 1125   BP: 133/70   BP Location: Left arm   Patient Position: Sitting   Cuff Size: Large Adult   Pulse: 56   Resp: 16   Weight: 112 kg (246 lb 3.2 oz)   Height: 158.4 cm (62.36\")   PainSc: 0-No pain         Physical Exam:  Physical Exam  Vitals reviewed.   Constitutional:       General: She is not in acute distress.     Appearance: Normal appearance.   Neck:      Vascular: No carotid bruit.   Cardiovascular:      Rate and Rhythm: Normal rate and regular rhythm.      Heart sounds: Normal heart sounds. No murmur heard.  Pulmonary:      Effort: Pulmonary effort is normal. No respiratory distress.      Breath sounds: Normal breath sounds.   Musculoskeletal:      Right lower leg: No edema.     "  Left lower leg: No edema.   Neurological:      Mental Status: She is alert.   Psychiatric:         Mood and Affect: Mood normal.         Behavior: Behavior normal.         Result Review      The following data was reviewed by: JEROME Villela on 02/20/2023:    Results for orders placed or performed in visit on 08/30/22   Microalbumin / Creatinine Urine Ratio - Urine, Clean Catch    Specimen: Urine, Clean Catch   Result Value Ref Range    Microalbumin/Creatinine Ratio      Creatinine, Urine 75.6 mg/dL    Microalbumin, Urine <1.2 mg/dL   Comprehensive Metabolic Panel    Specimen: Blood   Result Value Ref Range    Glucose 144 (H) 65 - 99 mg/dL    BUN 10 8 - 23 mg/dL    Creatinine 0.73 0.57 - 1.00 mg/dL    Sodium 142 136 - 145 mmol/L    Potassium 4.1 3.5 - 5.2 mmol/L    Chloride 103 98 - 107 mmol/L    CO2 29.8 (H) 22.0 - 29.0 mmol/L    Calcium 9.8 8.6 - 10.5 mg/dL    Total Protein 6.4 6.0 - 8.5 g/dL    Albumin 4.30 3.50 - 5.20 g/dL    ALT (SGPT) 30 1 - 33 U/L    AST (SGOT) 36 (H) 1 - 32 U/L    Alkaline Phosphatase 59 39 - 117 U/L    Total Bilirubin 0.6 0.0 - 1.2 mg/dL    Globulin 2.1 gm/dL    A/G Ratio 2.0 g/dL    BUN/Creatinine Ratio 13.7 7.0 - 25.0    Anion Gap 9.2 5.0 - 15.0 mmol/L    eGFR 91.4 >60.0 mL/min/1.73   Lipid Panel    Specimen: Blood   Result Value Ref Range    Total Cholesterol 161 0 - 200 mg/dL    Triglycerides 182 (H) 0 - 150 mg/dL    HDL Cholesterol 35 (L) 40 - 60 mg/dL    LDL Cholesterol  94 0 - 100 mg/dL    VLDL Cholesterol 32 5 - 40 mg/dL    LDL/HDL Ratio 2.56    Hemoglobin A1c    Specimen: Blood   Result Value Ref Range    Hemoglobin A1C 7.10 (H) 4.80 - 5.60 %               Assessment and Plan:          Diagnoses and all orders for this visit:    1. Essential hypertension (Primary)  Assessment & Plan:  Hypertension is stable. Continue to monitor BP at home. Continue current meds. Continue to modify diet and lifestyle.     Orders:  -     Comprehensive Metabolic Panel; Future  -      atenolol (TENORMIN) 50 MG tablet; Take 1 tablet by mouth Daily.  Dispense: 90 tablet; Refill: 1  -     hydroCHLOROthiazide (HYDRODIURIL) 25 MG tablet; Take 1 tablet by mouth Daily.  Dispense: 90 tablet; Refill: 1    2. Mixed hyperlipidemia  Assessment & Plan:  Continue current medication and efforts with diet and exercise.       Orders:  -     Comprehensive Metabolic Panel; Future  -     Lipid Panel; Future  -     atorvastatin (LIPITOR) 20 MG tablet; Take 1 tablet by mouth Daily.  Dispense: 90 tablet; Refill: 1    3. Type 2 diabetes mellitus without complication, without long-term current use of insulin (HCC)  Assessment & Plan:  To work on diet, regular exercise, monitor sugars, check feet daily, see optometrist yearly or as directed.  After labs I will increase her metformin  mg, and will stop her januvia    Orders:  -     Comprehensive Metabolic Panel; Future  -     Lipid Panel; Future  -     Hemoglobin A1c; Future    4. Family history of colon cancer  Assessment & Plan:  Sending for the colonoscopy Dr Ca did in 2021, reviewed the pathology from 10-8-21           Follow Up   Return for followup pending lab results.  Patient was given instructions and counseling regarding her condition or for health maintenance advice. Please see specific information pulled into the AVS if appropriate.

## 2023-02-20 NOTE — TELEPHONE ENCOUNTER
----- Message from JEROME Villela sent at 2/20/2023 11:52 AM EST -----  Send for her last colon dr snyder she said , but see

## 2023-02-21 LAB
ALBUMIN SERPL-MCNC: 4.4 G/DL (ref 3.5–5.2)
ALBUMIN/GLOB SERPL: 1.6 G/DL
ALP SERPL-CCNC: 65 U/L (ref 39–117)
ALT SERPL W P-5'-P-CCNC: 40 U/L (ref 1–33)
ANION GAP SERPL CALCULATED.3IONS-SCNC: 9.1 MMOL/L (ref 5–15)
AST SERPL-CCNC: 45 U/L (ref 1–32)
BILIRUB SERPL-MCNC: 0.5 MG/DL (ref 0–1.2)
BUN SERPL-MCNC: 11 MG/DL (ref 8–23)
BUN/CREAT SERPL: 18 (ref 7–25)
CALCIUM SPEC-SCNC: 9.5 MG/DL (ref 8.6–10.5)
CHLORIDE SERPL-SCNC: 103 MMOL/L (ref 98–107)
CHOLEST SERPL-MCNC: 168 MG/DL (ref 0–200)
CO2 SERPL-SCNC: 29.9 MMOL/L (ref 22–29)
CREAT SERPL-MCNC: 0.61 MG/DL (ref 0.57–1)
EGFRCR SERPLBLD CKD-EPI 2021: 98.7 ML/MIN/1.73
GLOBULIN UR ELPH-MCNC: 2.7 GM/DL
GLUCOSE SERPL-MCNC: 124 MG/DL (ref 65–99)
HDLC SERPL-MCNC: 39 MG/DL (ref 40–60)
LDLC SERPL CALC-MCNC: 99 MG/DL (ref 0–100)
LDLC/HDLC SERPL: 2.43 {RATIO}
POTASSIUM SERPL-SCNC: 4.2 MMOL/L (ref 3.5–5.2)
PROT SERPL-MCNC: 7.1 G/DL (ref 6–8.5)
SODIUM SERPL-SCNC: 142 MMOL/L (ref 136–145)
TRIGL SERPL-MCNC: 171 MG/DL (ref 0–150)
VLDLC SERPL-MCNC: 30 MG/DL (ref 5–40)

## 2023-05-19 NOTE — TELEPHONE ENCOUNTER
Rx Refill Note  Requested Prescriptions     Pending Prescriptions Disp Refills   • metFORMIN (GLUCOPHAGE) 500 MG tablet [Pharmacy Med Name: METFORMIN HYDROCHLORIDE 500 MG Tablet] 180 tablet 0     Sig: TAKE 2 TABLETS EVERY DAY      Last office visit with prescribing clinician: 2/20/2023       Next office visit with prescribing clinician: 8/31/202      {Rowena Junior LPN  05/19/23, 11:54 EDT

## 2023-08-31 ENCOUNTER — LAB (OUTPATIENT)
Dept: LAB | Facility: HOSPITAL | Age: 68
End: 2023-08-31
Payer: MEDICARE

## 2023-08-31 ENCOUNTER — OFFICE VISIT (OUTPATIENT)
Dept: FAMILY MEDICINE CLINIC | Age: 68
End: 2023-08-31
Payer: MEDICARE

## 2023-08-31 VITALS
WEIGHT: 245.8 LBS | HEIGHT: 62 IN | SYSTOLIC BLOOD PRESSURE: 134 MMHG | DIASTOLIC BLOOD PRESSURE: 77 MMHG | BODY MASS INDEX: 45.23 KG/M2 | TEMPERATURE: 98.2 F | HEART RATE: 62 BPM

## 2023-08-31 DIAGNOSIS — I10 ESSENTIAL HYPERTENSION: ICD-10-CM

## 2023-08-31 DIAGNOSIS — E78.2 MIXED HYPERLIPIDEMIA: ICD-10-CM

## 2023-08-31 DIAGNOSIS — E11.9 TYPE 2 DIABETES MELLITUS WITHOUT COMPLICATION, WITHOUT LONG-TERM CURRENT USE OF INSULIN: ICD-10-CM

## 2023-08-31 DIAGNOSIS — Z00.00 ROUTINE GENERAL MEDICAL EXAMINATION AT A HEALTH CARE FACILITY: Primary | ICD-10-CM

## 2023-08-31 LAB
ALBUMIN UR-MCNC: <1.2 MG/DL
CHOLEST SERPL-MCNC: 179 MG/DL (ref 0–200)
CREAT UR-MCNC: 81.8 MG/DL
HBA1C MFR BLD: 8.2 % (ref 4.8–5.6)
HDLC SERPL-MCNC: 33 MG/DL (ref 40–60)
LDLC SERPL CALC-MCNC: 108 MG/DL (ref 0–100)
LDLC/HDLC SERPL: 3.09 {RATIO}
MICROALBUMIN/CREAT UR: NORMAL MG/G{CREAT}
TRIGL SERPL-MCNC: 220 MG/DL (ref 0–150)
VLDLC SERPL-MCNC: 38 MG/DL (ref 5–40)

## 2023-08-31 PROCEDURE — 36415 COLL VENOUS BLD VENIPUNCTURE: CPT

## 2023-08-31 PROCEDURE — 82043 UR ALBUMIN QUANTITATIVE: CPT

## 2023-08-31 PROCEDURE — 82570 ASSAY OF URINE CREATININE: CPT

## 2023-08-31 PROCEDURE — 83036 HEMOGLOBIN GLYCOSYLATED A1C: CPT

## 2023-08-31 PROCEDURE — 80053 COMPREHEN METABOLIC PANEL: CPT

## 2023-08-31 PROCEDURE — 80061 LIPID PANEL: CPT

## 2023-08-31 RX ORDER — ATORVASTATIN CALCIUM 20 MG/1
20 TABLET, FILM COATED ORAL DAILY
Qty: 90 TABLET | Refills: 1 | Status: SHIPPED | OUTPATIENT
Start: 2023-08-31

## 2023-08-31 RX ORDER — PSYLLIUM HUSK (WITH SUGAR) 3 G/12 G
POWDER (GRAM) ORAL 3 TIMES DAILY
COMMUNITY

## 2023-08-31 RX ORDER — HYDROCHLOROTHIAZIDE 25 MG/1
25 TABLET ORAL DAILY
Qty: 90 TABLET | Refills: 1 | Status: SHIPPED | OUTPATIENT
Start: 2023-08-31

## 2023-08-31 RX ORDER — ATENOLOL 50 MG/1
50 TABLET ORAL DAILY
Qty: 90 TABLET | Refills: 1 | Status: SHIPPED | OUTPATIENT
Start: 2023-08-31

## 2023-08-31 NOTE — PROGRESS NOTES
The ABCs of the Annual Wellness Visit  Subsequent Medicare Wellness Visit    Subjective    Ro Conti is a 68 y.o. female who presents for a Subsequent Medicare Wellness Visit.    Medicare wellness HPI  Exercises regularly:daily   Eats healthy:yes   Last mammogram:last one normal 8-17-22, next one scheduled for 9-20-23  Last DEXA:09-6-22 normal   Last pap smear:2019 have been normal   BSE: occ  Wears seatbelts: yes   Living will: yes, copy in our file  Optometrist:Giovany and will follow up   D[facility]ist:[facility]  Tobacco:smokes some when she drinks beer   Alcohol intake:once a week or less   Drugs:none   Falls:none   Colonoscopy: 10-8-2021 polyps   Immunizations: pneu 20    The following portions of the patient's history were reviewed and   updated as appropriate: allergies, current medications, past family history, past medical history, past social history, past surgical history, and problem list.    Compared to one year ago, the patient feels her physical   health is the same.    Compared to one year ago, the patient feels her mental   health is the same.    Recent Hospitalizations:  She was not admitted to the hospital during the last year.       Current Medical Providers:  Patient Care Team:  Jesusita Rodriguez APRN as PCP - General (Family Medicine)    Outpatient Medications Prior to Visit   Medication Sig Dispense Refill    Accu-Chek Softclix Lancets lancets Use as instructed to test blood sugar once daily 100 each 3    Blood Glucose Monitoring Suppl (Accu-Chek Judi Plus) w/Device kit Use as instructed to test blood sugar once daily 1 kit 0    glucose blood (Accu-Chek Judi Plus) test strip Use as instructed to test blood sugar once daily 100 each 3    metFORMIN (GLUCOPHAGE) 500 MG tablet TAKE 2 TABLETS EVERY  tablet 0    Omega-3 Fatty Acids (fish oil) 1000 MG capsule capsule Take  by mouth Daily With Breakfast.      Psyllium (Fiber) 28.3 % powder Take  by mouth 3  "(Three) Times a Day.      atenolol (TENORMIN) 50 MG tablet Take 1 tablet by mouth Daily. 90 tablet 1    atorvastatin (LIPITOR) 20 MG tablet Take 1 tablet by mouth Daily. 90 tablet 1    hydroCHLOROthiazide (HYDRODIURIL) 25 MG tablet Take 1 tablet by mouth Daily. 90 tablet 1     No facility-administered medications prior to visit.       No opioid medication identified on active medication list. I have reviewed chart for other potential  high risk medication/s and harmful drug interactions in the elderly.        Aspirin is not on active medication list.  Aspirin use is not indicated based on review of current medical condition/s. Risk of harm outweighs potential benefits.  .    Patient Active Problem List   Diagnosis    Family history of colon cancer    Encounter for screening mammogram for malignant neoplasm of breast    Encounter for annual routine gynecological examination    Type 2 diabetes mellitus without complication, without long-term current use of insulin    Essential hypertension    Mixed hyperlipidemia    Routine general medical examination at a health care facility    Postmenopausal    Immunization due     Advance Care Planning   Advance Care Planning     Advance Directive is on file.  ACP discussion was held with the patient during this visit. Patient has an advance directive in EMR which is still valid.      Objective    Vitals:    08/31/23 0930   BP: 134/77   BP Location: Right arm   Patient Position: Sitting   Cuff Size: Large Adult   Pulse: 62   Temp: 98.2 øF (36.8 øC)   TempSrc: Oral   Weight: 111 kg (245 lb 12.8 oz)   Height: 158.4 cm (62.36\")   PainSc: 0-No pain     Estimated body mass index is 44.44 kg/mý as calculated from the following:    Height as of this encounter: 158.4 cm (62.36\").    Weight as of this encounter: 111 kg (245 lb 12.8 oz).    Class 3 Severe Obesity (BMI >=40). Obesity-related health conditions include the following: hypertension, diabetes mellitus, and dyslipidemias. Obesity " is unchanged. BMI is is above average; BMI management plan is completed. We discussed portion control and increasing exercise.      Does the patient have evidence of cognitive impairment? No          HEALTH RISK ASSESSMENT    Smoking Status:  Social History     Tobacco Use   Smoking Status Some Days    Packs/day: 0.25    Years: 30.00    Pack years: 7.50    Types: Cigarettes    Passive exposure: Never   Smokeless Tobacco Never     Alcohol Consumption:  Social History     Substance and Sexual Activity   Alcohol Use Yes    Comment: 2-3 TIMES PER WK QUANTITY OF ALCOHOL IS 2-12 DRINKS TYPICALLY BEER BUT ONLY SOCIALLY     Fall Risk Screen:    CM Fall Risk Assessment was completed, and patient is at LOW risk for falls.Assessment completed on:2023    Depression Screenin/31/2023     9:35 AM   PHQ-2/PHQ-9 Depression Screening   Little Interest or Pleasure in Doing Things 0-->not at all   Feeling Down, Depressed or Hopeless 0-->not at all   PHQ-9: Brief Depression Severity Measure Score 0       Health Habits and Functional and Cognitive Screenin/31/2023     9:35 AM   Functional & Cognitive Status   Do you have difficulty preparing food and eating? No   Do you have difficulty bathing yourself, getting dressed or grooming yourself? No   Do you have difficulty using the toilet? No   Do you have difficulty moving around from place to place? No   Do you have trouble with steps or getting out of a bed or a chair? No   Current Diet Well Balanced Diet   Dental Exam Up to date   Eye Exam Up to date   Exercise (times per week) 7 times per week   Current Exercises Include Yard Work;Gardening;Light Weights   Do you need help using the phone?  No   Are you deaf or do you have serious difficulty hearing?  No   Do you need help to go to places out of walking distance? No   Do you need help shopping? No   Do you need help preparing meals?  No   Do you need help with housework?  No   Do you need help with laundry? No    Do you need help taking your medications? No   Do you need help managing money? No   Do you ever drive or ride in a car without wearing a seat belt? No   Have you felt unusual stress, anger or loneliness in the last month? No   Who do you live with? Alone   If you need help, do you have trouble finding someone available to you? No   Have you been bothered in the last four weeks by sexual problems? No   Do you have difficulty concentrating, remembering or making decisions? No       Age-appropriate Screening Schedule:  Refer to the list below for future screening recommendations based on patient's age, sex and/or medical conditions. Orders for these recommended tests are listed in the plan section. The patient has been provided with a written plan.    Health Maintenance   Topic Date Due    TDAP/TD VACCINES (2 - Tdap) 07/01/2006    ZOSTER VACCINE (1 of 2) Never done    COVID-19 Vaccine (4 - Pfizer series) 01/07/2022    DIABETIC FOOT EXAM  03/03/2023    DIABETIC EYE EXAM  08/16/2023    HEMOGLOBIN A1C  08/20/2023    URINE MICROALBUMIN  08/30/2023    BMI FOLLOWUP  08/30/2023    INFLUENZA VACCINE  10/01/2023    LIPID PANEL  02/20/2024    MAMMOGRAM  08/17/2024    ANNUAL WELLNESS VISIT  08/31/2024    DXA SCAN  09/06/2024    COLORECTAL CANCER SCREENING  03/11/2026    HEPATITIS C SCREENING  Completed    Pneumococcal Vaccine 65+  Completed                  CMS Preventative Services Quick Reference  Risk Factors Identified During Encounter  Immunizations Discussed/Encouraged: Tdap, Influenza, Shingrix, and COVID19  The above risks/problems have been discussed with the patient.  Pertinent information has been shared with the patient in the After Visit Summary.  An After Visit Summary and PPPS were made available to the patient.    Follow Up:   Next Medicare Wellness visit to be scheduled in 1 year.       Additional E&M Note during same encounter follows:  Patient has multiple medical problems which are significant and  separately identifiable that require additional work above and beyond the Medicare Wellness Visit.      Chief Complaint  Medicare Wellness-subsequent    Subjective        Diabetes:  Current medication: was on januvia, stopped due to expense and now 2 metformin   Tolerating medication: Yes  Last eye exam: past due   Last foot exam: > 1 year   At home BS ranges: 130-170  Uses mail order and got a rx refills   Lab Results       Component                Value               Date                       HGBA1C                   7.10 (H)            02/20/2023                Hyperlipidemia  Current medication: lipitor   Tolerating medication: Yes  Needs Refill: Yes to [facility]     Lab Results       Component                Value               Date                       CHOL                     168                 02/20/2023                 CHLPL                    157                 05/07/2021                 TRIG                     171 (H)             02/20/2023                 HDL                      39 (L)              02/20/2023                 LDL                      99                  02/20/2023                Hypertension:  Current medication: atenolol and HCTZ   Tolerating Medication: Yes  Checking BP at home and it is: not checking   Needs refills: Yes to [facility]   Labs:  Lab Results       Component                Value               Date                       GLUCOSE                  124 (H)             02/20/2023                 BUN                      11                  02/20/2023                 CREATININE               0.61                02/20/2023                 EGFRIFNONA               86                  11/23/2021                 BCR                      18.0                02/20/2023                 K                        4.2                 02/20/2023                 CO2                      29.9 (H)            02/20/2023                 CALCIUM                  9.5                  2023                 ALBUMIN                  4.4                 2023                 LABIL2                   1.7                 2021                 AST                      45 (H)              2023                 ALT                      40 (H)              2023              '  PAST MEDICAL HISTORY no changes since :            GYNECOLOGICAL HISTORY:        Menopause at age 50.    Last Pap was 19         PREVENTIVE HEALTH MAINTENANCE            COLORECTAL CANCER SCREENING: Up to date (colonoscopy q10y; sigmoidoscopy q5y; Cologuard q3y) was last done  3-2016, Results are in chart; colonoscopy with the following abnormalities noted-- scattered diverticulosis;  10-8-21 tubular adenoma and hyperplastic polyp The next colonoscopy is due       Hepatitis C Medicare Screening: was last done ; negative             Surgical History:         Cataract Removal: right; ;     : X 1;     Tubal Ligation     lasik ;     Hernia Repair: ; umbilical;     Procedures:    Colonoscopy ( 3-11-16 dr snyder, scattered divertiuclar dx ) & 3-2022        Family History:     Father:  at age 89; Cause of death was septic meningitis;  Hypertension;  Colon Cancer; Prostate Cancer; Skin Cancer ( melanoma );  Dementia     Mother:  at age 84; Cause of death was brain bleed;  Hypertension;  Pulmonary Hypertension;  Renal Insufficency     Brother(s): 3 brother(s) total; 1 ;  Prostate Cancer;  MVA     Sister(s): 4 sister(s) total; 1 ;  Breast Cancer ( 2 sisters ); Colon Cancer     Son(s): Hernandez sarcoma     Maternal Grandmother: Coronary Artery Disease         Social History:     Occupation: Retired (Prior occupation: [employer]). 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   HENT:  Positive for postnasal drip (allergy related). Negative for sore throat.    Eyes:  Negative for visual disturbance.   Respiratory:  Negative for cough and shortness of breath.    Cardiovascular:  Negative for chest pain, palpitations and leg swelling.   Gastrointestinal:  Negative for abdominal pain.   Genitourinary:  Negative for difficulty urinating.   Musculoskeletal:  Negative for arthralgias.   Skin:  Negative for rash.   Hematological:  Negative for adenopathy.   Psychiatric/Behavioral:  Negative for sleep disturbance.      Objective   Vital Signs:  /77 (BP Location: Right arm, Patient Position: Sitting, Cuff Size: Large Adult)   Pulse 62   Temp 98.2 øF (36.8 øC) (Oral)   Ht 158.4 cm (62.36\")   Wt 111 kg (245 lb 12.8 oz)   BMI 44.44 kg/mý     Physical Exam  Vitals reviewed.   Constitutional:       General: She is not in acute distress.     Appearance: Normal appearance. She is well-developed. She is obese.   HENT:      Head: Normocephalic. Hair is normal.      Right Ear: Hearing, tympanic membrane, ear canal and external ear normal. No decreased hearing noted. No drainage.      Left Ear: Hearing, tympanic membrane, ear canal and external ear normal. No decreased hearing noted.      Nose: Nose normal. No nasal deformity.      Mouth/Throat:      Mouth: Mucous membranes are moist.   Eyes:      General: Lids are normal.      Extraocular Movements: Extraocular movements intact.      Conjunctiva/sclera: Conjunctivae normal.      Pupils: Pupils are equal, round, and reactive to light.   Neck:      Thyroid: No thyromegaly.      Vascular: No carotid bruit or JVD.   Cardiovascular:      Rate and Rhythm: Normal rate and regular rhythm.      Pulses: Normal pulses.           Posterior tibial pulses are 2+ on the right side and 2+ on the left side.      Heart sounds: Normal heart sounds. No murmur heard.    No friction rub. No " gallop.   Pulmonary:      Effort: Pulmonary effort is normal. No respiratory distress.      Breath sounds: Normal breath sounds. No wheezing.   Chest:   Breasts:     Right: Normal. No mass, nipple discharge or skin change.      Left: Normal. No mass, nipple discharge or skin change.   Abdominal:      General: Bowel sounds are normal.      Palpations: Abdomen is soft. There is no mass.      Tenderness: There is no abdominal tenderness.   Musculoskeletal:         General: No swelling, tenderness or deformity. Normal range of motion.      Cervical back: Normal range of motion and neck supple.   Feet:      Right foot:      Protective Sensation: 3 sites tested.  3 sites sensed.      Skin integrity: Skin integrity normal. Dry skin present. No ulcer or blister.      Toenail Condition: Right toenails are normal.      Left foot:      Protective Sensation: 3 sites tested.  3 sites sensed.      Skin integrity: Skin integrity normal. Dry skin present. No ulcer or blister.      Toenail Condition: Left toenails are normal.      Comments: Diabetic Foot Exam Performed and Monofilament Test Performed     Lymphadenopathy:      Cervical: No cervical adenopathy.      Upper Body:      Right upper body: No axillary adenopathy.      Left upper body: No axillary adenopathy.   Skin:     General: Skin is warm and dry.      Findings: No erythema or rash.   Neurological:      Mental Status: She is alert and oriented to person, place, and time.      Motor: No abnormal muscle tone.      Gait: Gait normal.      Deep Tendon Reflexes: Reflexes are normal and symmetric.   Psychiatric:         Mood and Affect: Mood normal.         Behavior: Behavior normal.         Thought Content: Thought content normal.         Judgment: Judgment normal.                       Assessment and Plan   Diagnoses and all orders for this visit:    1. Routine general medical examination at a health care facility (Primary)  Assessment & Plan:  Advise regular exercise,  healthy eating, always wear seat belts. Living will discussed, UTD, fall prevention discussed.  Immunizations discussed. To check with our pharmacy today and get the Tdap and then work on getting shingrex, and in the fall flu and covid vaccines should be updated.   To continue optometry and dental exams.    Keep upcoming appt for her  mammogram and do  monthly BSE         2. Type 2 diabetes mellitus without complication, without long-term current use of insulin  Assessment & Plan:  Reviewed blood sugar log, readings on average are running in the am over 150, To work on diet, regular exercise, weight loss, to continue to monitor sugars, check feet daily, set up an optometrist appt  Will send rx after lab, will switch to metformoin XR, and will need to probably go up on dose to 1500 mg per day     Orders:  -     Comprehensive Metabolic Panel; Future  -     Lipid Panel; Future  -     Hemoglobin A1c; Future  -     Microalbumin / Creatinine Urine Ratio - Urine, Clean Catch; Future    3. Essential hypertension  Assessment & Plan:  continue Rx's.    Orders:  -     atenolol (TENORMIN) 50 MG tablet; Take 1 tablet by mouth Daily.  Dispense: 90 tablet; Refill: 1  -     hydroCHLOROthiazide (HYDRODIURIL) 25 MG tablet; Take 1 tablet by mouth Daily.  Dispense: 90 tablet; Refill: 1    4. Mixed hyperlipidemia  Assessment & Plan:  Continue current medication and efforts with diet and exercise.       Orders:  -     atorvastatin (LIPITOR) 20 MG tablet; Take 1 tablet by mouth Daily.  Dispense: 90 tablet; Refill: 1             Follow Up   Return for followup pending lab results.  Patient was given instructions and counseling regarding her condition or for health maintenance advice. Please see specific information pulled into the AVS if appropriate.

## 2023-08-31 NOTE — ASSESSMENT & PLAN NOTE
Advise regular exercise, healthy eating, always wear seat belts. Living will discussed, UTD, fall prevention discussed.  Immunizations discussed. To check with our pharmacy today and get the Tdap and then work on getting shingrex, and in the fall flu and covid vaccines should be updated.   To continue optometry and dental exams.    Keep upcoming appt for her  mammogram and do  monthly BSE

## 2023-08-31 NOTE — ASSESSMENT & PLAN NOTE
Reviewed blood sugar log, readings on average are running in the am over 150, To work on diet, regular exercise, weight loss, to continue to monitor sugars, check feet daily, set up an optometrist appt  Will send rx after lab, will switch to metformoin XR, and will need to probably go up on dose to 1500 mg per day

## 2023-09-01 DIAGNOSIS — E11.9 TYPE 2 DIABETES MELLITUS WITHOUT COMPLICATION, WITHOUT LONG-TERM CURRENT USE OF INSULIN: Primary | ICD-10-CM

## 2023-09-01 DIAGNOSIS — E78.2 MIXED HYPERLIPIDEMIA: ICD-10-CM

## 2023-09-01 LAB
ALBUMIN SERPL-MCNC: 4.5 G/DL (ref 3.5–5.2)
ALBUMIN/GLOB SERPL: 1.6 G/DL
ALP SERPL-CCNC: 67 U/L (ref 39–117)
ALT SERPL W P-5'-P-CCNC: 51 U/L (ref 1–33)
ANION GAP SERPL CALCULATED.3IONS-SCNC: 15 MMOL/L (ref 5–15)
AST SERPL-CCNC: 79 U/L (ref 1–32)
BILIRUB SERPL-MCNC: 0.6 MG/DL (ref 0–1.2)
BUN SERPL-MCNC: 11 MG/DL (ref 8–23)
BUN/CREAT SERPL: 14.9 (ref 7–25)
CALCIUM SPEC-SCNC: 9.9 MG/DL (ref 8.6–10.5)
CHLORIDE SERPL-SCNC: 99 MMOL/L (ref 98–107)
CO2 SERPL-SCNC: 27 MMOL/L (ref 22–29)
CREAT SERPL-MCNC: 0.74 MG/DL (ref 0.57–1)
EGFRCR SERPLBLD CKD-EPI 2021: 89.4 ML/MIN/1.73
GLOBULIN UR ELPH-MCNC: 2.9 GM/DL
GLUCOSE SERPL-MCNC: 142 MG/DL (ref 65–99)
POTASSIUM SERPL-SCNC: 3.9 MMOL/L (ref 3.5–5.2)
PROT SERPL-MCNC: 7.4 G/DL (ref 6–8.5)
SODIUM SERPL-SCNC: 141 MMOL/L (ref 136–145)

## 2023-09-01 RX ORDER — METFORMIN HYDROCHLORIDE 500 MG/1
TABLET, EXTENDED RELEASE ORAL
Qty: 270 TABLET | Refills: 1 | Status: SHIPPED | OUTPATIENT
Start: 2023-09-01

## 2023-09-20 ENCOUNTER — HOSPITAL ENCOUNTER (OUTPATIENT)
Dept: MAMMOGRAPHY | Facility: HOSPITAL | Age: 68
Discharge: HOME | End: 2023-09-20
Payer: MEDICARE

## 2023-09-20 ENCOUNTER — HOSPITAL ENCOUNTER (OUTPATIENT)
Dept: ULTRASOUND IMAGING | Facility: HOSPITAL | Age: 68
Discharge: HOME | End: 2023-09-20
Payer: MEDICARE

## 2023-09-20 ENCOUNTER — TELEPHONE (OUTPATIENT)
Dept: FAMILY MEDICINE CLINIC | Age: 68
End: 2023-09-20
Payer: MEDICARE

## 2023-09-20 DIAGNOSIS — N64.52 NIPPLE DISCHARGE: Primary | ICD-10-CM

## 2023-09-20 DIAGNOSIS — N64.52 NIPPLE DISCHARGE: ICD-10-CM

## 2023-09-20 DIAGNOSIS — Z12.31 VISIT FOR SCREENING MAMMOGRAM: ICD-10-CM

## 2023-09-20 PROCEDURE — G0279 TOMOSYNTHESIS, MAMMO: HCPCS

## 2023-09-20 PROCEDURE — 76642 ULTRASOUND BREAST LIMITED: CPT

## 2023-09-20 PROCEDURE — 77066 DX MAMMO INCL CAD BI: CPT

## 2023-09-20 NOTE — TELEPHONE ENCOUNTER
Radiology dept said:   THIS PT IS HERE FOR SCREENING MAMMO, SHE HAS BROWN DISCHARGE FROM RIGHT NIPPLE, WE NEED TO TURN THIS INTO A BILATERAL DIAGNOSTIC MAMMOGRAM WITH RIGHT BREAST LIMITED ULTRASOUND, CAN WE PLEASE GET AN ORDER PUT IN FOR THAT? THANKS

## 2023-09-21 DIAGNOSIS — N64.52 NIPPLE DISCHARGE IN FEMALE: Primary | ICD-10-CM

## 2023-10-05 ENCOUNTER — OFFICE VISIT (OUTPATIENT)
Dept: SURGERY | Facility: CLINIC | Age: 68
End: 2023-10-05
Payer: MEDICARE

## 2023-10-05 VITALS
SYSTOLIC BLOOD PRESSURE: 125 MMHG | HEART RATE: 63 BPM | BODY MASS INDEX: 45.6 KG/M2 | WEIGHT: 247.8 LBS | HEIGHT: 62 IN | DIASTOLIC BLOOD PRESSURE: 80 MMHG

## 2023-10-05 DIAGNOSIS — N64.52 NIPPLE DISCHARGE: Primary | ICD-10-CM

## 2023-10-05 PROCEDURE — 3074F SYST BP LT 130 MM HG: CPT | Performed by: SURGERY

## 2023-10-05 PROCEDURE — 1160F RVW MEDS BY RX/DR IN RCRD: CPT | Performed by: SURGERY

## 2023-10-05 PROCEDURE — 99203 OFFICE O/P NEW LOW 30 MIN: CPT | Performed by: SURGERY

## 2023-10-05 PROCEDURE — 1159F MED LIST DOCD IN RCRD: CPT | Performed by: SURGERY

## 2023-10-05 PROCEDURE — 3079F DIAST BP 80-89 MM HG: CPT | Performed by: SURGERY

## 2023-10-05 NOTE — PROGRESS NOTES
Chief Complaint: Breast Discharge (Right Breast/), Abnormal Breast Imaging, and FHx Breast Cancer    Subjective         History of Present Illness  Ro Conti is a 68 y.o. female presents to [facility] to be seen for bloody left nipple discharge.  Her imaging is shown below:    Narrative & Impression   PROCEDURE:  MAMMO DIAGNOSTIC DIGITAL TOMOSYNTHESIS BILATERAL W CAD, 9/20/2023, 9:03  US BREAST BILATERAL LIMITED, 9/20/2023, 9:36     COMPARISON:  Other, MG, DIGITAL SCREENING NO CAD, 2/01/2017, 9:10.  Other, MG, DIGITAL SCREENING NO   CAD, 2/28/2018, 7:28.  [facility] B[facility] JAVY W 3D CAMELIA, 4/12/2019,   12:23.  [facility] ANDREA, MG, DIG SCREENING BILAT JAVY W 3D CAMELIA, 7/22/2020, 9:31.  [facility]CRE[facility] DIGITAL TOMOSYNTHESIS BILATERAL W CAD, 8/04/2021, 9:02.    [facility]CREEN[facility] DIGITAL TOMOSYNTHESIS BILATERAL W CAD, 8/17/2022,   9:13.     VIEWS:  DIAGNOSTIC VIEWS WERE OBTAINED UTILIZING 3D TOMOSYNTHESIS AND R2 CAD SOFTWARE     INDICATIONS:  Patient reports unilateral right-sided brown nipple discharge.     FINDINGS:          Bilateral diagnostic mammogram:  There are no suspicious masses, areas of architectural distortion, or suspicious microcalcification   within the right breast.  Additional spot-compression within the retroareolar region of the right   breast does not demonstrate any discrete abnormality.     There is a small focal asymmetry within the inferior aspect of the left breast at the 5-6 o'clock   position at mid depth.  There is a single associated calcification.  This demonstrates partial   effacement on additional spot compression.     Limited bilateral breast ultrasound:  Sonographic grayscale and color Doppler images of the right breast were obtained with   representative examples submitted to PACs for interpretation.  Imaging of the retroareolar region   of  the right breast does not demonstrate any ductal ectasia or intraductal mass.  There is a tiny 2   mm cyst at the 8 o'clock position 1 cm from the nipple of the right breast which is incidental.     Sonographic grayscale and color Doppler images of the left breast were obtained with representative   examples submitted to PACs for interpretation.  Imaging at the 6 o'clock position 7-8 cm from the   nipple demonstrates an oval cluster of cysts measuring 7 mm x 3 mm x 7 mm which appears to   correspond to the location, size, and shape of the left breast focal asymmetry.     IMPRESSION:  1. Right-sided unilateral dark brown nipple discharge without underlying retroareolar mammographic   or sonographic abnormality.  Surgical consultation is recommended for evaluation of possible   pathologic nipple discharge.  2. Probably benign cluster cyst within the inferior aspect of the left breast at the 6 o'clock   position 7-8 cm the nipple.  A left diagnostic mammogram and limited left breast ultrasound is   recommended in 6 months to assess for stability of this probably benign finding.     The findings and recommendations were discussed with the patient at the time of the exam.       RECOMMENDATION(S):               SURGICAL CONSULTATION.       SHORT TERM FOLLOW-UP DIAGNOSTIC MAMMOGRAM LEFT BREAST IN 6 MONTHS.         Objective     Past Medical History:   Diagnosis Date    Colon polyp     Hyperlipidemia     Hypertension     Nonalcoholic fatty liver disease     Type 2 diabetes mellitus without complication        Past Surgical History:   Procedure Laterality Date    CATARACT EXTRACTION Right 2019     SECTION      X1    COLONOSCOPY N/A 2016 &     DIVERTICULAR    HERNIA REPAIR  2015    UMBILICAL    LASIK  2000    TUBAL ABDOMINAL LIGATION           Current Outpatient Medications:     Accu-Chek Softclix Lancets lancets, Use as instructed to test blood sugar once daily, Disp: 100 each, Rfl: 3    atenolol  "(TENORMIN) 50 MG tablet, Take 1 tablet by mouth Daily., Disp: 90 tablet, Rfl: 1    atorvastatin (LIPITOR) 20 MG tablet, Take 1 tablet by mouth Daily., Disp: 90 tablet, Rfl: 1    Blood Glucose Monitoring Suppl (Accu-Chek Judi Plus) w/Device kit, Use as instructed to test blood sugar once daily, Disp: 1 kit, Rfl: 0    glucose blood (Accu-Chek Judi Plus) test strip, Use as instructed to test blood sugar once daily, Disp: 100 each, Rfl: 3    hydroCHLOROthiazide (HYDRODIURIL) 25 MG tablet, Take 1 tablet by mouth Daily., Disp: 90 tablet, Rfl: 1    metFORMIN ER (GLUCOPHAGE-XR) 500 MG 24 hr tablet, Three tabs a day with largest meal, Disp: 270 tablet, Rfl: 1    Omega-3 Fatty Acids (fish oil) 1000 MG capsule capsule, Take  by mouth Daily With Breakfast., Disp: , Rfl:     Psyllium (Fiber) 28.3 % powder, Take  by mouth 3 (Three) Times a Day., Disp: , Rfl:     No Known Allergies     Family History   Problem Relation Age of Onset    Hypertension Mother     Other Mother         RENAL INSUFICENCY    Colon cancer Father     Prostate cancer Father     Skin cancer Father     Dementia Father     Hypertension Father     Breast cancer Sister         NOT SPECIFIED (2 SISTERS)    Colon cancer Sister         4 SISTERS 1     NOT SPECIFIED    Prostate cancer Brother         3 BROTHERS 1    NOT SPECIFIED MVA    Coronary artery disease Maternal Grandmother     Other Son         EWNG SARCOMA       Social History     Socioeconomic History    Marital status: Single    Number of children: 1   Tobacco Use    Smoking status: Passive Smoke Exposure - Never Smoker     Passive exposure: Never    Smokeless tobacco: Never   Substance and Sexual Activity    Alcohol use: Yes     Alcohol/week: 12.0 standard drinks     Types: 12 Cans of beer per week     Comment: 2-3 TIMES PER WK QUANTITY OF ALCOHOL IS 2-12 DRINKS TYPICALLY BEER BUT ONLY SOCIALLY    Drug use: Never       Vital Signs:   /80   Pulse 63   Ht 157.5 cm (62\")   Wt 112 kg " (247 lb 12.8 oz)   BMI 45.32 kg/m²    Review of Systems    Physical Exam  Vitals and nursing note reviewed.   Constitutional:       Appearance: Normal appearance.   HENT:      Head: Normocephalic and atraumatic.   Eyes:      Extraocular Movements: Extraocular movements intact.      Pupils: Pupils are equal, round, and reactive to light.   Cardiovascular:      Pulses: Normal pulses.   Pulmonary:      Effort: Pulmonary effort is normal. No accessory muscle usage or respiratory distress.   Chest:   Breasts:     Right: Normal. Nipple discharge present. No inverted nipple or skin change.      Left: Normal. No inverted nipple, nipple discharge or skin change.      Comments: Bloody right nipple discharge from multiple ducts  Abdominal:      General: Abdomen is flat.      Palpations: Abdomen is soft.      Tenderness: There is no abdominal tenderness. There is no guarding.   Musculoskeletal:         General: No swelling, tenderness or deformity.      Cervical back: Neck supple.   Lymphadenopathy:      Upper Body:      Right upper body: No supraclavicular or axillary adenopathy.      Left upper body: No supraclavicular or axillary adenopathy.   Skin:     General: Skin is warm and dry.   Neurological:      General: No focal deficit present.      Mental Status: She is alert and oriented to person, place, and time.   Psychiatric:         Mood and Affect: Mood normal.         Thought Content: Thought content normal.        Result Review :               Assessment and Plan    Diagnoses and all orders for this visit:    1. Nipple discharge (Primary)  -     MRI Breast Bilateral Screening With & Without Contrast; Future    Will check MRI and call her with results     Follow Up   Return for Followup after imaging study complete.  Patient was given instructions and counseling regarding her condition or for health maintenance advice. Please see specific information pulled into the AVS if appropriate.         This document has been  electronically signed by Jessica Chen MD  October 5, 2023 10:28 EDT

## 2023-10-27 ENCOUNTER — HOSPITAL ENCOUNTER (OUTPATIENT)
Dept: MRI IMAGING | Facility: HOSPITAL | Age: 68
Discharge: HOME | End: 2023-10-27
Admitting: SURGERY
Payer: MEDICARE

## 2023-10-27 DIAGNOSIS — N64.52 NIPPLE DISCHARGE: ICD-10-CM

## 2023-10-27 PROCEDURE — 0 GADOBENATE DIMEGLUMINE 529 MG/ML SOLUTION: Performed by: SURGERY

## 2023-10-27 PROCEDURE — C8908 MRI W/O FOL W/CONT, BREAST,: HCPCS

## 2023-10-27 PROCEDURE — A9577 INJ MULTIHANCE: HCPCS | Performed by: SURGERY

## 2023-10-27 PROCEDURE — C8937 CAD BREAST MRI: HCPCS

## 2023-10-27 RX ADMIN — GADOBENATE DIMEGLUMINE 20 ML: 529 INJECTION, SOLUTION INTRAVENOUS at 12:26

## 2023-11-07 ENCOUNTER — HOSPITAL ENCOUNTER (OUTPATIENT)
Dept: ULTRASOUND IMAGING | Facility: HOSPITAL | Age: 68
Discharge: HOME | End: 2023-11-07
Admitting: SURGERY
Payer: MEDICARE

## 2023-11-07 DIAGNOSIS — N63.42 SUBAREOLAR MASS OF LEFT BREAST: ICD-10-CM

## 2023-11-07 PROCEDURE — 76642 ULTRASOUND BREAST LIMITED: CPT

## 2023-11-16 ENCOUNTER — HOSPITAL ENCOUNTER (OUTPATIENT)
Dept: MAMMOGRAPHY | Facility: HOSPITAL | Age: 68
Discharge: HOME | End: 2023-11-16
Payer: MEDICARE

## 2023-11-16 ENCOUNTER — HOSPITAL ENCOUNTER (OUTPATIENT)
Dept: MRI IMAGING | Facility: HOSPITAL | Age: 68
Discharge: HOME | End: 2023-11-16
Payer: MEDICARE

## 2023-11-16 DIAGNOSIS — N63.42 SUBAREOLAR MASS OF LEFT BREAST: ICD-10-CM

## 2023-11-16 LAB
CREAT BLDA-MCNC: 0.6 MG/DL
EGFRCR SERPLBLD CKD-EPI 2021: 99.1 ML/MIN/1.73

## 2023-11-16 PROCEDURE — 0 GADOBENATE DIMEGLUMINE 529 MG/ML SOLUTION: Performed by: SURGERY

## 2023-11-16 PROCEDURE — A9577 INJ MULTIHANCE: HCPCS | Performed by: SURGERY

## 2023-11-16 PROCEDURE — 25010000002 LIDOCAINE 1 % SOLUTION: Performed by: SURGERY

## 2023-11-16 PROCEDURE — 88305 TISSUE EXAM BY PATHOLOGIST: CPT | Performed by: SURGERY

## 2023-11-16 PROCEDURE — 82565 ASSAY OF CREATININE: CPT

## 2023-11-16 PROCEDURE — A4648 IMPLANTABLE TISSUE MARKER: HCPCS

## 2023-11-16 RX ORDER — LIDOCAINE HYDROCHLORIDE 10 MG/ML
10 INJECTION, SOLUTION INFILTRATION; PERINEURAL ONCE
Status: COMPLETED | OUTPATIENT
Start: 2023-11-16 | End: 2023-11-16

## 2023-11-16 RX ORDER — LIDOCAINE HYDROCHLORIDE AND EPINEPHRINE 10; 10 MG/ML; UG/ML
10 INJECTION, SOLUTION INFILTRATION; PERINEURAL ONCE
Status: COMPLETED | OUTPATIENT
Start: 2023-11-16 | End: 2023-11-16

## 2023-11-16 RX ADMIN — LIDOCAINE HYDROCHLORIDE 10 ML: 10 INJECTION, SOLUTION INFILTRATION; PERINEURAL at 10:10

## 2023-11-16 RX ADMIN — GADOBENATE DIMEGLUMINE 20 ML: 529 INJECTION, SOLUTION INTRAVENOUS at 08:49

## 2023-11-16 RX ADMIN — LIDOCAINE HYDROCHLORIDE,EPINEPHRINE BITARTRATE 10 ML: 10; .01 INJECTION, SOLUTION INFILTRATION; PERINEURAL at 10:11

## 2023-11-17 ENCOUNTER — PATIENT OUTREACH (OUTPATIENT)
Dept: ONCOLOGY | Facility: HOSPITAL | Age: 68
End: 2023-11-17
Payer: MEDICARE

## 2023-11-17 LAB
CYTO UR: NORMAL
LAB AP CASE REPORT: NORMAL
LAB AP CLINICAL INFORMATION: NORMAL
PATH REPORT.FINAL DX SPEC: NORMAL
PATH REPORT.GROSS SPEC: NORMAL

## 2023-11-20 ENCOUNTER — PATIENT OUTREACH (OUTPATIENT)
Dept: ONCOLOGY | Facility: HOSPITAL | Age: 68
End: 2023-11-20
Payer: MEDICARE

## 2023-11-21 RX ORDER — LANCETS
EACH MISCELLANEOUS
Qty: 100 EACH | Refills: 3 | Status: SHIPPED | OUTPATIENT
Start: 2023-11-21

## 2023-11-21 RX ORDER — BLOOD SUGAR DIAGNOSTIC
STRIP MISCELLANEOUS
Qty: 100 EACH | Refills: 3 | Status: SHIPPED | OUTPATIENT
Start: 2023-11-21

## 2024-01-02 ENCOUNTER — TELEPHONE (OUTPATIENT)
Dept: FAMILY MEDICINE CLINIC | Age: 69
End: 2024-01-02
Payer: MEDICARE

## 2024-01-02 NOTE — TELEPHONE ENCOUNTER
1st attempt- spoke with pt about overdue results ordered on 9-4-23 by pcp she stated that she would try to be here this week or next to complete

## 2024-01-10 ENCOUNTER — LAB (OUTPATIENT)
Dept: LAB | Facility: HOSPITAL | Age: 69
End: 2024-01-10
Payer: MEDICARE

## 2024-01-10 DIAGNOSIS — E11.9 TYPE 2 DIABETES MELLITUS WITHOUT COMPLICATION, WITHOUT LONG-TERM CURRENT USE OF INSULIN: ICD-10-CM

## 2024-01-10 DIAGNOSIS — E78.2 MIXED HYPERLIPIDEMIA: ICD-10-CM

## 2024-01-10 LAB
ALBUMIN SERPL-MCNC: 4.5 G/DL (ref 3.5–5.2)
ALBUMIN/GLOB SERPL: 1.7 G/DL
ALP SERPL-CCNC: 76 U/L (ref 39–117)
ALT SERPL W P-5'-P-CCNC: 39 U/L (ref 1–33)
ANION GAP SERPL CALCULATED.3IONS-SCNC: 13.1 MMOL/L (ref 5–15)
AST SERPL-CCNC: 58 U/L (ref 1–32)
BILIRUB SERPL-MCNC: 0.6 MG/DL (ref 0–1.2)
BUN SERPL-MCNC: 7 MG/DL (ref 8–23)
BUN/CREAT SERPL: 11.7 (ref 7–25)
CALCIUM SPEC-SCNC: 10.4 MG/DL (ref 8.6–10.5)
CHLORIDE SERPL-SCNC: 99 MMOL/L (ref 98–107)
CHOLEST SERPL-MCNC: 157 MG/DL (ref 0–200)
CO2 SERPL-SCNC: 27.9 MMOL/L (ref 22–29)
CREAT SERPL-MCNC: 0.6 MG/DL (ref 0.57–1)
EGFRCR SERPLBLD CKD-EPI 2021: 99.1 ML/MIN/1.73
GLOBULIN UR ELPH-MCNC: 2.6 GM/DL
GLUCOSE SERPL-MCNC: 127 MG/DL (ref 65–99)
HBA1C MFR BLD: 7.7 % (ref 4.8–5.6)
HDLC SERPL-MCNC: 37 MG/DL (ref 40–60)
LDLC SERPL CALC-MCNC: 95 MG/DL (ref 0–100)
LDLC/HDLC SERPL: 2.49 {RATIO}
POTASSIUM SERPL-SCNC: 4.1 MMOL/L (ref 3.5–5.2)
PROT SERPL-MCNC: 7.1 G/DL (ref 6–8.5)
SODIUM SERPL-SCNC: 140 MMOL/L (ref 136–145)
TRIGL SERPL-MCNC: 140 MG/DL (ref 0–150)
VLDLC SERPL-MCNC: 25 MG/DL (ref 5–40)

## 2024-01-10 PROCEDURE — 83036 HEMOGLOBIN GLYCOSYLATED A1C: CPT

## 2024-01-10 PROCEDURE — 80061 LIPID PANEL: CPT

## 2024-01-10 PROCEDURE — 36415 COLL VENOUS BLD VENIPUNCTURE: CPT

## 2024-01-10 PROCEDURE — 80053 COMPREHEN METABOLIC PANEL: CPT

## 2024-02-28 ENCOUNTER — OFFICE VISIT (OUTPATIENT)
Dept: FAMILY MEDICINE CLINIC | Age: 69
End: 2024-02-28
Payer: MEDICARE

## 2024-02-28 VITALS
TEMPERATURE: 98.1 F | SYSTOLIC BLOOD PRESSURE: 131 MMHG | HEART RATE: 58 BPM | WEIGHT: 244.6 LBS | HEIGHT: 62 IN | BODY MASS INDEX: 45.01 KG/M2 | DIASTOLIC BLOOD PRESSURE: 75 MMHG

## 2024-02-28 DIAGNOSIS — E78.2 MIXED HYPERLIPIDEMIA: ICD-10-CM

## 2024-02-28 DIAGNOSIS — E11.9 TYPE 2 DIABETES MELLITUS WITHOUT COMPLICATION, WITHOUT LONG-TERM CURRENT USE OF INSULIN: ICD-10-CM

## 2024-02-28 DIAGNOSIS — I10 ESSENTIAL HYPERTENSION: ICD-10-CM

## 2024-02-28 RX ORDER — ATORVASTATIN CALCIUM 20 MG/1
20 TABLET, FILM COATED ORAL DAILY
Qty: 90 TABLET | Refills: 1 | Status: SHIPPED | OUTPATIENT
Start: 2024-02-28

## 2024-02-28 RX ORDER — HYDROCHLOROTHIAZIDE 25 MG/1
25 TABLET ORAL DAILY
Qty: 90 TABLET | Refills: 1 | Status: SHIPPED | OUTPATIENT
Start: 2024-02-28

## 2024-02-28 RX ORDER — METFORMIN HYDROCHLORIDE 500 MG/1
TABLET, EXTENDED RELEASE ORAL
Qty: 270 TABLET | Refills: 1 | Status: SHIPPED | OUTPATIENT
Start: 2024-02-28

## 2024-02-28 RX ORDER — ATENOLOL 50 MG/1
50 TABLET ORAL DAILY
Qty: 90 TABLET | Refills: 1 | Status: SHIPPED | OUTPATIENT
Start: 2024-02-28

## 2024-02-28 NOTE — ASSESSMENT & PLAN NOTE
Health Maintenance Due   Topic Date Due   • Hepatitis B Vaccine (1 of 3 - Risk 3-dose series) [birth year]/1972   • Shingles Vaccine (1 of 2) 07/30/2003   • Colorectal Cancer Screening-Cologuard  08/15/2019   • Medicare Wellness Visit  05/31/2020       Patient is due for topics as listed above but is not proceeding with Immunization(s) Hep B and Shingles at this time. Patient will discuss with doctor at today's visit.     Over the last 2 weeks, how often have you been bothered by the following problems?          PHQ2 Score:  2  PHQ2 Score Interpretation:  No further screening needed  1. Little interest or pleasure in activity?:  0  2. Feeling down, depressed, or hopeless?:  2              To work on diet, regular exercise, continue to monitor sugars, reviewed glucose log, check feet daily, see optometrist yearly or as directed.  continue current dose of metformin, could consider increasing to 4 a day of metformin

## 2024-02-28 NOTE — ASSESSMENT & PLAN NOTE
Hypertension is stable.  to monitor BP at home. Continue current meds. Continue to modify diet and lifestyle. Reviewed labs from last month

## 2024-02-28 NOTE — PROGRESS NOTES
Chief Complaint  Hypertension (6 month follow up HTN, HLD, and diabetes. )    Subjective          Ro Conti presents to [facility]    History of Present Illness  Diabetes:  Current medication: metformin  3 a day  Tolerating medication: Yes  Last eye exam: went 1-11-24 [facility]   Last foot exam: 8-2024  At home BS ranges: 112-140 (better when she went up to 3 metformin a day)   Lab Results       Component                Value               Date                       HGBA1C                   7.70 (H)            01/10/2024                Hypertension:  Current medication: atenolol, HCTZ   Tolerating Medication: Yes  Checking BP at home and it is: not checking   Needs refills: Yes [facility]   Labs:  Lab Results       Component                Value               Date                       GLUCOSE                  127 (H)             01/10/2024                 BUN                      7 (L)               01/10/2024                 CREATININE               0.60                01/10/2024                 EGFRIFNONA               86                  11/23/2021                 BCR                      11.7                01/10/2024                 K                        4.1                 01/10/2024                 CO2                      27.9                01/10/2024                 CALCIUM                  10.4                01/10/2024                 ALBUMIN                  4.5                 01/10/2024                 LABIL2                   1.7                 05/07/2021                 AST                      58 (H)              01/10/2024                 ALT                      39 (H)              01/10/2024              Hyperlipidemia  Current medication: lipitor   Tolerating medication: Yes  Needs Refill: Yes to [facility]     Lab Results       Component                Value               Date                       CHOL                     157                  01/10/2024                 CHLPL                    157                 2021                 TRIG                     140                 01/10/2024                 HDL                      37 (L)              01/10/2024                 LDL                      95                  01/10/2024              PAST MEDICAL HISTORY no changes since :            GYNECOLOGICAL HISTORY:        Menopause at age 50.    Last Pap was 19         PREVENTIVE HEALTH MAINTENANCE            COLORECTAL CANCER SCREENING: Up to date (colonoscopy q10y; sigmoidoscopy q5y; Cologuard q3y) was last done  3-2016, Results are in chart; colonoscopy with the following abnormalities noted-- scattered diverticulosis;  10-8-21 tubular adenoma and hyperplastic polyp The next colonoscopy is due       Hepatitis C Medicare Screening: was last done ; negative             Surgical History:     23 biopsy left breast, benign       Eye laser procedure R 24    Cataract Removal: right; ;     : X 1;     Tubal Ligation     lasik ;     Hernia Repair: ; umbilical;     Procedures:    Colonoscopy ( 3-11-16 dr snyder, scattered divertiuclar dx ) & 3-2022        Family History:       Father:  at age 89; Cause of death was septic meningitis;  Hypertension;  Colon Cancer; Prostate Cancer; Skin Cancer ( melanoma );  Dementia     Mother:  at age 84; Cause of death was brain bleed;  Hypertension;  Pulmonary Hypertension;  Renal Insufficency     Brother(s): 3 brother(s) total; 1 ;  1 Prostate Cancer;  MVA     Sister(s): 4 sister(s) total; 1 ;  Breast Cancer ( 2 sisters ); 1 Colon Cancer     Son(s): Hernandez sarcoma     Maternal Grandmother: Coronary Artery Disease         Social History:     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;     Marital Status:      Children: 1 child                 Past Medical History:   Diagnosis Date    Colon polyp     Hyperlipidemia     Hypertension     Nonalcoholic fatty liver disease     Type 2 diabetes mellitus without complication        No Known Allergies     Past Surgical History:   Procedure Laterality Date    CATARACT EXTRACTION Right 2019     SECTION      X1    COLONOSCOPY N/A 2016 &     DIVERTICULAR    HERNIA REPAIR  2015    UMBILICAL    LASIK  2000    TUBAL ABDOMINAL LIGATION          Social History     Tobacco Use    Smoking status: Never     Passive exposure: Yes    Smokeless tobacco: Never   Substance Use Topics    Alcohol use: Yes     Alcohol/week: 12.0 standard drinks of alcohol     Types: 12 Cans of beer per week     Comment: 2-3 TIMES PER WK QUANTITY OF ALCOHOL IS 2-12 DRINKS TYPICALLY BEER BUT ONLY SOCIALLY       Family History   Problem Relation Age of Onset    Hypertension Mother     Other Mother         RENAL INSUFICENCY    Colon cancer Father     Prostate cancer Father     Skin cancer Father     Dementia Father     Hypertension Father     Breast cancer Sister         NOT SPECIFIED (2 SISTERS)    Colon cancer Sister         4 SISTERS 1     NOT SPECIFIED    Prostate cancer Brother         3 BROTHERS 1    NOT SPECIFIED MVA    Coronary artery disease Maternal Grandmother     Other Son         EWNG SARCOMA        Health Maintenance Due   Topic Date Due    TDAP/TD VACCINES (2 - Tdap) 2006    ZOSTER VACCINE (1 of 2) Never done    DIABETIC EYE EXAM  2023    COVID-19 Vaccine ( season) 2023        Current Outpatient Medications on File Prior to Visit   Medication Sig    Accu-Chek Softclix Lancets lancets Use as instructed to test blood sugar once daily    Blood Glucose Monitoring Suppl (Accu-Chek Judi Plus) w/Device kit Use as instructed to test blood sugar once daily    glucose blood (Accu-Chek Judi Plus) test strip Use as instructed to test blood sugar once daily     "Omega-3 Fatty Acids (fish oil) 1000 MG capsule capsule Take  by mouth Daily With Breakfast.    Psyllium (Fiber) 28.3 % powder Take  by mouth 3 (Three) Times a Day.    [DISCONTINUED] atenolol (TENORMIN) 50 MG tablet Take 1 tablet by mouth Daily.    [DISCONTINUED] atorvastatin (LIPITOR) 20 MG tablet Take 1 tablet by mouth Daily.    [DISCONTINUED] hydroCHLOROthiazide (HYDRODIURIL) 25 MG tablet Take 1 tablet by mouth Daily.    [DISCONTINUED] metFORMIN ER (GLUCOPHAGE-XR) 500 MG 24 hr tablet Three tabs a day with largest meal     No current facility-administered medications on file prior to visit.       Immunization History   Administered Date(s) Administered    COVID-19 (PFIZER) Purple Cap Monovalent 03/15/2021, 04/05/2021, 11/12/2021    Pneumococcal Conjugate 20-Valent (PCV20) 08/30/2022    Td (TDVAX) 07/01/1996       Review of Systems   Constitutional:  Negative for fatigue and fever.   Respiratory:  Negative for cough and shortness of breath.    Cardiovascular:  Negative for chest pain, palpitations and leg swelling.        Objective     Vitals:    02/28/24 0912   BP: 131/75   BP Location: Right arm   Patient Position: Sitting   Cuff Size: Large Adult   Pulse: 58   Temp: 98.1 °F (36.7 °C)   TempSrc: Oral   Weight: 111 kg (244 lb 9.6 oz)   Height: 157.5 cm (62\")            Physical Exam  Vitals reviewed.   Constitutional:       General: She is not in acute distress.     Appearance: Normal appearance.   Neck:      Vascular: No carotid bruit.   Cardiovascular:      Rate and Rhythm: Normal rate and regular rhythm.      Heart sounds: Normal heart sounds. No murmur heard.  Pulmonary:      Effort: Pulmonary effort is normal. No respiratory distress.      Breath sounds: Normal breath sounds.   Musculoskeletal:      Right lower leg: No edema.      Left lower leg: No edema.   Neurological:      Mental Status: She is alert.   Psychiatric:         Mood and Affect: Mood normal.         Behavior: Behavior normal.         Result " Review :     The following data was reviewed by: JEROME Villela on 02/28/2024:                       Assessment and Plan      Diagnoses and all orders for this visit:    1. Essential hypertension  Assessment & Plan:  Hypertension is stable.  to monitor BP at home. Continue current meds. Continue to modify diet and lifestyle. Reviewed labs from last month     Orders:  -     atenolol (TENORMIN) 50 MG tablet; Take 1 tablet by mouth Daily.  Dispense: 90 tablet; Refill: 1  -     hydroCHLOROthiazide 25 MG tablet; Take 1 tablet by mouth Daily.  Dispense: 90 tablet; Refill: 1    2. Mixed hyperlipidemia  Assessment & Plan:   Continue current medication and efforts with diet and exercise.       Orders:  -     atorvastatin (LIPITOR) 20 MG tablet; Take 1 tablet by mouth Daily.  Dispense: 90 tablet; Refill: 1    3. Type 2 diabetes mellitus without complication, without long-term current use of insulin  Assessment & Plan:  To work on diet, regular exercise, continue to monitor sugars, reviewed glucose log, check feet daily, see optometrist yearly or as directed.  continue current dose of metformin, could consider increasing to 4 a day of metformin     Orders:  -     metFORMIN ER (GLUCOPHAGE-XR) 500 MG 24 hr tablet; Three tabs a day with largest meal  Dispense: 270 tablet; Refill: 1                    Follow Up     Return for Next scheduled follow up and is due a pap smear in 9-2024.    Patient was given instructions and counseling regarding her condition or for health maintenance advice. Please see specific information pulled into the AVS if appropriate.

## 2024-07-17 DIAGNOSIS — E11.9 TYPE 2 DIABETES MELLITUS WITHOUT COMPLICATION, WITHOUT LONG-TERM CURRENT USE OF INSULIN: ICD-10-CM

## 2024-07-17 DIAGNOSIS — I10 ESSENTIAL HYPERTENSION: ICD-10-CM

## 2024-07-17 DIAGNOSIS — E78.2 MIXED HYPERLIPIDEMIA: ICD-10-CM

## 2024-07-17 RX ORDER — HYDROCHLOROTHIAZIDE 25 MG/1
25 TABLET ORAL DAILY
Qty: 90 TABLET | Refills: 0 | Status: SHIPPED | OUTPATIENT
Start: 2024-07-17

## 2024-07-17 RX ORDER — METFORMIN HYDROCHLORIDE 500 MG/1
TABLET, EXTENDED RELEASE ORAL
Qty: 270 TABLET | Refills: 0 | Status: SHIPPED | OUTPATIENT
Start: 2024-07-17

## 2024-07-17 RX ORDER — ATENOLOL 50 MG/1
50 TABLET ORAL DAILY
Qty: 90 TABLET | Refills: 0 | Status: SHIPPED | OUTPATIENT
Start: 2024-07-17

## 2024-07-17 RX ORDER — ATORVASTATIN CALCIUM 20 MG/1
20 TABLET, FILM COATED ORAL DAILY
Qty: 90 TABLET | Refills: 0 | Status: SHIPPED | OUTPATIENT
Start: 2024-07-17

## 2024-09-04 ENCOUNTER — OFFICE VISIT (OUTPATIENT)
Dept: FAMILY MEDICINE CLINIC | Age: 69
End: 2024-09-04
Payer: MEDICARE

## 2024-09-04 ENCOUNTER — LAB (OUTPATIENT)
Dept: LAB | Facility: HOSPITAL | Age: 69
End: 2024-09-04
Payer: MEDICARE

## 2024-09-04 VITALS
SYSTOLIC BLOOD PRESSURE: 122 MMHG | HEIGHT: 62 IN | BODY MASS INDEX: 44.39 KG/M2 | DIASTOLIC BLOOD PRESSURE: 62 MMHG | WEIGHT: 241.2 LBS | TEMPERATURE: 97.9 F | HEART RATE: 59 BPM

## 2024-09-04 DIAGNOSIS — E11.9 TYPE 2 DIABETES MELLITUS WITHOUT COMPLICATION, WITHOUT LONG-TERM CURRENT USE OF INSULIN: ICD-10-CM

## 2024-09-04 DIAGNOSIS — Z00.00 ROUTINE GENERAL MEDICAL EXAMINATION AT A HEALTH CARE FACILITY: Primary | ICD-10-CM

## 2024-09-04 DIAGNOSIS — K42.9 UMBILICAL HERNIA WITHOUT OBSTRUCTION AND WITHOUT GANGRENE: ICD-10-CM

## 2024-09-04 DIAGNOSIS — I10 ESSENTIAL HYPERTENSION: ICD-10-CM

## 2024-09-04 DIAGNOSIS — E78.2 MIXED HYPERLIPIDEMIA: ICD-10-CM

## 2024-09-04 DIAGNOSIS — Z00.00 ROUTINE GENERAL MEDICAL EXAMINATION AT A HEALTH CARE FACILITY: ICD-10-CM

## 2024-09-04 DIAGNOSIS — Z12.31 ENCOUNTER FOR SCREENING MAMMOGRAM FOR MALIGNANT NEOPLASM OF BREAST: ICD-10-CM

## 2024-09-04 DIAGNOSIS — L98.9 FACIAL SKIN LESION: ICD-10-CM

## 2024-09-04 DIAGNOSIS — Z78.0 POSTMENOPAUSAL: ICD-10-CM

## 2024-09-04 LAB
ALBUMIN SERPL-MCNC: 4.4 G/DL (ref 3.5–5.2)
ALBUMIN UR-MCNC: <1.2 MG/DL
ALBUMIN/GLOB SERPL: 1.8 G/DL
ALP SERPL-CCNC: 74 U/L (ref 39–117)
ALT SERPL W P-5'-P-CCNC: 41 U/L (ref 1–33)
ANION GAP SERPL CALCULATED.3IONS-SCNC: 11.2 MMOL/L (ref 5–15)
AST SERPL-CCNC: 54 U/L (ref 1–32)
BASOPHILS # BLD AUTO: 0.04 10*3/MM3 (ref 0–0.2)
BASOPHILS NFR BLD AUTO: 0.5 % (ref 0–1.5)
BILIRUB SERPL-MCNC: 0.7 MG/DL (ref 0–1.2)
BUN SERPL-MCNC: 11 MG/DL (ref 8–23)
BUN/CREAT SERPL: 17.5 (ref 7–25)
CALCIUM SPEC-SCNC: 10.2 MG/DL (ref 8.6–10.5)
CHLORIDE SERPL-SCNC: 102 MMOL/L (ref 98–107)
CHOLEST SERPL-MCNC: 152 MG/DL (ref 0–200)
CO2 SERPL-SCNC: 26.8 MMOL/L (ref 22–29)
CREAT SERPL-MCNC: 0.63 MG/DL (ref 0.57–1)
CREAT UR-MCNC: 80.5 MG/DL
DEPRECATED RDW RBC AUTO: 48.8 FL (ref 37–54)
EGFRCR SERPLBLD CKD-EPI 2021: 97.4 ML/MIN/1.73
EOSINOPHIL # BLD AUTO: 0.1 10*3/MM3 (ref 0–0.4)
EOSINOPHIL NFR BLD AUTO: 1.3 % (ref 0.3–6.2)
ERYTHROCYTE [DISTWIDTH] IN BLOOD BY AUTOMATED COUNT: 13.5 % (ref 12.3–15.4)
GLOBULIN UR ELPH-MCNC: 2.5 GM/DL
GLUCOSE SERPL-MCNC: 154 MG/DL (ref 65–99)
HBA1C MFR BLD: 7.4 % (ref 4.8–5.6)
HCT VFR BLD AUTO: 41.5 % (ref 34–46.6)
HDLC SERPL-MCNC: 29 MG/DL (ref 40–60)
HGB BLD-MCNC: 13.4 G/DL (ref 12–15.9)
IMM GRANULOCYTES # BLD AUTO: 0.02 10*3/MM3 (ref 0–0.05)
IMM GRANULOCYTES NFR BLD AUTO: 0.3 % (ref 0–0.5)
LDLC SERPL CALC-MCNC: 93 MG/DL (ref 0–100)
LDLC/HDLC SERPL: 3.07 {RATIO}
LYMPHOCYTES # BLD AUTO: 1.79 10*3/MM3 (ref 0.7–3.1)
LYMPHOCYTES NFR BLD AUTO: 23.2 % (ref 19.6–45.3)
MCH RBC QN AUTO: 31.7 PG (ref 26.6–33)
MCHC RBC AUTO-ENTMCNC: 32.3 G/DL (ref 31.5–35.7)
MCV RBC AUTO: 98.1 FL (ref 79–97)
MICROALBUMIN/CREAT UR: NORMAL MG/G{CREAT}
MONOCYTES # BLD AUTO: 0.6 10*3/MM3 (ref 0.1–0.9)
MONOCYTES NFR BLD AUTO: 7.8 % (ref 5–12)
NEUTROPHILS NFR BLD AUTO: 5.17 10*3/MM3 (ref 1.7–7)
NEUTROPHILS NFR BLD AUTO: 66.9 % (ref 42.7–76)
PLATELET # BLD AUTO: 272 10*3/MM3 (ref 140–450)
PMV BLD AUTO: 10.8 FL (ref 6–12)
POTASSIUM SERPL-SCNC: 4.1 MMOL/L (ref 3.5–5.2)
PROT SERPL-MCNC: 6.9 G/DL (ref 6–8.5)
RBC # BLD AUTO: 4.23 10*6/MM3 (ref 3.77–5.28)
SODIUM SERPL-SCNC: 140 MMOL/L (ref 136–145)
TRIGL SERPL-MCNC: 170 MG/DL (ref 0–150)
TSH SERPL DL<=0.05 MIU/L-ACNC: 2.31 UIU/ML (ref 0.27–4.2)
VLDLC SERPL-MCNC: 30 MG/DL (ref 5–40)
WBC NRBC COR # BLD AUTO: 7.72 10*3/MM3 (ref 3.4–10.8)

## 2024-09-04 PROCEDURE — 83036 HEMOGLOBIN GLYCOSYLATED A1C: CPT

## 2024-09-04 PROCEDURE — 36415 COLL VENOUS BLD VENIPUNCTURE: CPT

## 2024-09-04 PROCEDURE — 85025 COMPLETE CBC W/AUTO DIFF WBC: CPT

## 2024-09-04 PROCEDURE — 82570 ASSAY OF URINE CREATININE: CPT

## 2024-09-04 PROCEDURE — 80053 COMPREHEN METABOLIC PANEL: CPT

## 2024-09-04 PROCEDURE — 84443 ASSAY THYROID STIM HORMONE: CPT | Performed by: NURSE PRACTITIONER

## 2024-09-04 PROCEDURE — 82043 UR ALBUMIN QUANTITATIVE: CPT

## 2024-09-04 PROCEDURE — 80061 LIPID PANEL: CPT

## 2024-09-04 NOTE — ASSESSMENT & PLAN NOTE
Advise regular exercise, healthy eating, always wear seat belts. Living will discussed, copy in epic, fall prevention discussed.  Immunizations discussed, she is going to check on coverage of Tdap and shingrex at her pharmacy.   To continue yearly optometry and dental exams.

## 2024-09-04 NOTE — ASSESSMENT & PLAN NOTE
Hypertension is stable.advised to monitor BP at home. Continue current meds. Continue to modify diet and lifestyle. she is fasting today.

## 2024-09-04 NOTE — PROGRESS NOTES
Subjective   The ABCs of the Annual Wellness Visit  Medicare Wellness Visit      Ro Conti is a 69 y.o. patient who presents for a Medicare Wellness Visit.    Medicare wellness HPI  Exercises regularly:yes   Eats healthy:yes   Last mammogram:screening 9-, then MRI biopsy 11-16-23  Last DEXA:9-6-22 normal   Last pap smear:9-1-2021  BSE:occ, right breast has chronic nipple discharge   Wears seatbelts:yes   Living will:copy in [facility]   Optometrist:Andrey Perez 1-2024  Dentist:enzo Thomas [facility]   Tobacco:occ   Alcohol intake:drinks beer 1-2 times per week   Drugs:no   Falls:yes, but did not get injured July 2024    Colonoscopy:   Immunizations:UTD on pneumonia     The following portions of the patient's history were reviewed and   updated as appropriate: allergies, current medications, past family history, past medical history, past social history, past surgical history, and problem list.    Compared to one year ago, the patient's physical   health is better.  Compared to one year ago, the patient's mental   health is better.    Recent Hospitalizations:  She was not admitted to the hospital during the last year.     Current Medical Providers:  Patient Care Team:  Jesusita Rodriguez APRN as PCP - General (Family Medicine)  Jessica Chen MD as Consulting Physician (General Surgery)    Outpatient Medications Prior to Visit   Medication Sig Dispense Refill    Accu-Chek Softclix Lancets lancets Use as instructed to test blood sugar once daily 100 each 3    atenolol (TENORMIN) 50 MG tablet TAKE 1 TABLET EVERY DAY 90 tablet 0    atorvastatin (LIPITOR) 20 MG tablet TAKE 1 TABLET EVERY DAY 90 tablet 0    Blood Glucose Monitoring Suppl (Accu-Chek Judi Plus) w/Device kit Use as instructed to test blood sugar once daily 1 kit 0    glucose blood (Accu-Chek Judi Plus) test strip Use as instructed to test blood sugar once daily 100 each 3    hydroCHLOROthiazide 25 MG tablet TAKE 1 TABLET EVERY  "DAY 90 tablet 0    metFORMIN ER (GLUCOPHAGE-XR) 500 MG 24 hr tablet TAKE 3 TABLETS EVERY DAY WITH LARGEST MEAL 270 tablet 0    Omega-3 Fatty Acids (fish oil) 1000 MG capsule capsule Take  by mouth Daily With Breakfast.      Psyllium (Fiber) 28.3 % powder Take  by mouth 3 (Three) Times a Day.       No facility-administered medications prior to visit.     No opioid medication identified on active medication list. I have reviewed chart for other potential  high risk medication/s and harmful drug interactions in the elderly.      Aspirin is not on active medication list.  Aspirin use is not indicated based on review of current medical condition/s. Risk of harm outweighs potential benefits.  .    Patient Active Problem List   Diagnosis    Family history of colon cancer    Encounter for screening mammogram for malignant neoplasm of breast    Encounter for annual routine gynecological examination    Type 2 diabetes mellitus without complication, without long-term current use of insulin    Essential hypertension    Mixed hyperlipidemia    Routine general medical examination at a health care facility    Postmenopausal    Immunization due    Nipple discharge    Facial skin lesion    Umbilical hernia without obstruction and without gangrene     Advance Care Planning Advance Directive is on file.  ACP discussion was held with the patient during this visit. Patient has an advance directive in EMR which is still valid.             Objective   Vitals:    09/04/24 0931   BP: 122/62   BP Location: Left arm   Patient Position: Sitting   Cuff Size: Large Adult   Pulse: 59   Temp: 97.9 °F (36.6 °C)   TempSrc: Oral   Weight: 109 kg (241 lb 3.2 oz)   Height: 157.5 cm (62\")       Estimated body mass index is 44.12 kg/m² as calculated from the following:    Height as of this encounter: 157.5 cm (62\").    Weight as of this encounter: 109 kg (241 lb 3.2 oz).    Class 3 Severe Obesity (BMI >=40). Obesity-related health conditions include the " following: hypertension, diabetes mellitus, and dyslipidemias. Obesity is improving with lifestyle modifications. BMI is is above average; BMI management plan is completed. We discussed portion control and increasing exercise.       Does the patient have evidence of cognitive impairment? No                                                                                                Health  Risk Assessment    Smoking Status:  Social History     Tobacco Use   Smoking Status Never    Passive exposure: Yes   Smokeless Tobacco Never     Alcohol Consumption:  Social History     Substance and Sexual Activity   Alcohol Use Yes    Alcohol/week: 12.0 standard drinks of alcohol    Types: 12 Cans of beer per week    Comment: 2-3 TIMES PER WK QUANTITY OF ALCOHOL IS 2-12 DRINKS TYPICALLY BEER BUT ONLY SOCIALLY       Fall Risk Screen  STEADI Fall Risk Assessment was completed, and patient is at MODERATE risk for falls. Assessment completed on:2024    Depression Screenin/4/2024     9:33 AM   PHQ-2/PHQ-9 Depression Screening   Little Interest or Pleasure in Doing Things 0-->not at all   Feeling Down, Depressed or Hopeless 0-->not at all   PHQ-9: Brief Depression Severity Measure Score 0     Health Habits and Functional and Cognitive Screenin/4/2024     9:33 AM   Functional & Cognitive Status   Do you have difficulty preparing food and eating? No   Do you have difficulty bathing yourself, getting dressed or grooming yourself? No   Do you have difficulty using the toilet? No   Do you have difficulty moving around from place to place? No   Do you have trouble with steps or getting out of a bed or a chair? No   Current Diet Well Balanced Diet   Dental Exam Up to date   Eye Exam Up to date   Exercise (times per week) 5 times per week   Current Exercises Include Light Weights;Yard Work   Do you need help using the phone?  No   Are you deaf or do you have serious difficulty hearing?  No   Do you need help to go to  places out of walking distance? No   Do you need help shopping? No   Do you need help preparing meals?  No   Do you need help with housework?  No   Do you need help with laundry? No   Do you need help taking your medications? No   Do you need help managing money? No   Do you ever drive or ride in a car without wearing a seat belt? No   Have you felt unusual stress, anger or loneliness in the last month? No   Who do you live with? Alone   If you need help, do you have trouble finding someone available to you? No   Have you been bothered in the last four weeks by sexual problems? No   Do you have difficulty concentrating, remembering or making decisions? No           Age-appropriate Screening Schedule:  Refer to the list below for future screening recommendations based on patient's age, sex and/or medical conditions. Orders for these recommended tests are listed in the plan section. The patient has been provided with a written plan.    Health Maintenance List  Health Maintenance   Topic Date Due    TDAP/TD VACCINES (2 - Tdap) 07/01/2006    ZOSTER VACCINE (1 of 2) Never done    HEMOGLOBIN A1C  07/10/2024    URINE MICROALBUMIN  08/31/2024    INFLUENZA VACCINE  08/01/2024    DXA SCAN  09/06/2024    COVID-19 Vaccine (4 - 2023-24 season) 09/06/2024 (Originally 9/1/2024)    LIPID PANEL  01/10/2025    DIABETIC EYE EXAM  01/11/2025    ANNUAL WELLNESS VISIT  09/04/2025    DIABETIC FOOT EXAM  09/04/2025    BMI FOLLOWUP  09/04/2025    MAMMOGRAM  09/20/2025    COLORECTAL CANCER SCREENING  03/11/2026    HEPATITIS C SCREENING  Completed    Pneumococcal Vaccine 65+  Completed                                                                                                                                                Gait and Balance Evaluation: Normal  CMS Preventative Services Quick Reference  Risk Factors Identified During Encounter  Immunizations Discussed/Encouraged: Tdap, Influenza, Shingrix, and COVID19    The above  risks/problems have been discussed with the patient.  Pertinent information has been shared with the patient in the After Visit Summary.  An After Visit Summary and PPPS were made available to the patient.    Follow Up:   Next Medicare Wellness visit to be scheduled in 1 year.          Additional E&M Note during same encounter follows:  Patient has multiple medical problems which are significant and separately identifiable that require additional work above and beyond the Medicare Wellness Visit.      Chief Complaint  Medicare Wellness-subsequent    Ro Conti is a 69 y.o. female who presents to [facility]     Diabetes:  Current medication: Metformin XR  Tolerating medication: Yes  Last eye exam: 1-2024  Last foot exam: > 1 year   At home BS ranges: improved 121-177   Lab Results       Component                Value               Date                       HGBA1C                   7.70 (H)            01/10/2024                Hypertension:  Current medication: atenolol, HCTZ   Tolerating Medication: Yes  Checking BP at home and it is: not checking   Needs refills: no   Labs:  Lab Results       Component                Value               Date                       GLUCOSE                  127 (H)             01/10/2024                 BUN                      7 (L)               01/10/2024                 CREATININE               0.60                01/10/2024                 EGFRIFNONA               86                  11/23/2021                 BCR                      11.7                01/10/2024                 K                        4.1                 01/10/2024                 CO2                      27.9                01/10/2024                 CALCIUM                  10.4                01/10/2024                 ALBUMIN                  4.5                 01/10/2024                 LABIL2                   1.7                 05/07/2021                 AST                       58 (H)              01/10/2024                 ALT                      39 (H)              01/10/2024              Hyperlipidemia  Current medication: lipitor   Tolerating medication: Yes  Needs Refill: no     Lab Results       Component                Value               Date                       CHOL                     157                 01/10/2024                 CHLPL                    157                 2021                 TRIG                     140                 01/10/2024                 HDL                      37 (L)              01/10/2024                 LDL                      95                  01/10/2024                PAST MEDICAL HISTORY no changes since 2024:            GYNECOLOGICAL HISTORY:        Menopause at age 50.    Last Pap was 19         PREVENTIVE HEALTH MAINTENANCE            COLORECTAL CANCER SCREENING: Up to date (colonoscopy q10y; sigmoidoscopy q5y; Cologuard q3y) was last done  3-2016, Results are in chart; colonoscopy with the following abnormalities noted-- scattered diverticulosis;  10-8-21 tubular adenoma and hyperplastic polyp The next colonoscopy is due       Hepatitis C Medicare Screening: was last done ; negative             Surgical History:      23 biopsy left breast, benign       Eye laser procedure R 24    Cataract Removal: right; ;     : X 1;     Tubal Ligation     lasik ;     Hernia Repair: ; umbilical;     Procedures:    Colonoscopy ( 3-11-16 dr snyder, scattered divertiuclar dx ) & 3-2022        Family History:        Father:  at age 89; Cause of death was septic meningitis;  Hypertension;  Colon Cancer; Prostate Cancer; Skin Cancer ( melanoma );  Dementia     Mother:  at age 84; Cause of death was brain bleed;  Hypertension;  Pulmonary Hypertension;  Renal Insufficency     Brother(s): 3 brother(s) total; 1 ;  1 Prostate Cancer;  MVA     Sister(s):  "4 sister(s) total; 1 ;  Breast Cancer ( 2 sisters ); 1 Colon Cancer     Son(s): Hernandez sarcoma     Maternal Grandmother: Coronary Artery Disease         Social History:       Occupation: Retired (Prior occupation: [employer]). working part time at [employer];     Marital Status:      Children: 1 child                       Objective   Vital Signs:   Vitals:    24 0931   BP: 122/62   BP Location: Left arm   Patient Position: Sitting   Cuff Size: Large Adult   Pulse: 59   Temp: 97.9 °F (36.6 °C)   TempSrc: Oral   Weight: 109 kg (241 lb 3.2 oz)   Height: 157.5 cm (62\")     Body mass index is 44.12 kg/m².    Wt Readings from Last 3 Encounters:   24 109 kg (241 lb 3.2 oz)   24 111 kg (244 lb 9.6 oz)   10/05/23 112 kg (247 lb 12.8 oz)     BP Readings from Last 3 Encounters:   24 122/62   24 131/75   10/05/23 125/80       Physical Exam  Vitals reviewed.   Constitutional:       General: She is not in acute distress.     Appearance: Normal appearance. She is well-developed.   HENT:      Head: Normocephalic. Hair is normal.      Right Ear: Hearing, tympanic membrane, ear canal and external ear normal. No decreased hearing noted. No drainage.      Left Ear: Hearing, tympanic membrane, ear canal and external ear normal. No decreased hearing noted.      Nose: Nose normal. No nasal deformity.      Mouth/Throat:      Mouth: Mucous membranes are moist.   Eyes:      General: Lids are normal.      Extraocular Movements: Extraocular movements intact.      Conjunctiva/sclera: Conjunctivae normal.      Pupils: Pupils are equal, round, and reactive to light.   Neck:      Thyroid: No thyromegaly.      Vascular: No carotid bruit or JVD.   Cardiovascular:      Rate and Rhythm: Normal rate and regular rhythm.      Pulses: Normal pulses.           Posterior tibial pulses are 2+ on the right side and 2+ on the left side.      Heart sounds: Normal heart sounds. No " murmur heard.     No friction rub. No gallop.   Pulmonary:      Effort: Pulmonary effort is normal. No respiratory distress.      Breath sounds: Normal breath sounds. No wheezing.   Chest:   Breasts:     Right: Normal. No mass, nipple discharge or skin change.      Left: Normal. No mass, nipple discharge or skin change.   Abdominal:      General: Bowel sounds are normal.      Palpations: Abdomen is soft. There is no mass.      Tenderness: There is no abdominal tenderness.      Hernia: A hernia (soft swelling superior left laterally to umbilicus) is present.   Musculoskeletal:         General: No swelling, tenderness or deformity. Normal range of motion.      Cervical back: Normal range of motion and neck supple.   Feet:      Right foot:      Protective Sensation: 3 sites tested.  3 sites sensed.      Skin integrity: Dry skin present. No ulcer or blister.      Toenail Condition: Right toenails are normal.      Left foot:      Protective Sensation: 3 sites tested.  3 sites sensed.      Skin integrity: Skin integrity normal. No ulcer or blister.      Toenail Condition: Left toenails are normal.      Comments: Diabetic Foot Exam Performed and Monofilament Test Performed     Lymphadenopathy:      Cervical: No cervical adenopathy.      Upper Body:      Right upper body: No axillary adenopathy.      Left upper body: No axillary adenopathy.   Skin:     General: Skin is warm and dry.      Findings: Lesion (3 tiny white raised lesion on right side of face) present. No erythema or rash.   Neurological:      Mental Status: She is alert and oriented to person, place, and time.      Motor: No abnormal muscle tone.      Gait: Gait normal.      Deep Tendon Reflexes: Reflexes are normal and symmetric.   Psychiatric:         Mood and Affect: Mood normal.         Behavior: Behavior normal.         Thought Content: Thought content normal.         Judgment: Judgment normal.         Review of Systems   Constitutional:  Negative for  activity change, appetite change, chills, fatigue and fever.   HENT:  Negative for congestion and hearing loss.    Eyes:  Negative for visual disturbance.   Respiratory:  Negative for cough, shortness of breath and wheezing.    Cardiovascular:  Negative for chest pain, palpitations and leg swelling.   Gastrointestinal:  Negative for abdominal pain, constipation, diarrhea, nausea and vomiting.        Hernia on left side of abd, getting bigger    Musculoskeletal:  Negative for arthralgias and myalgias.   Skin:  Negative for rash.   Neurological:  Negative for dizziness, weakness and numbness.   Psychiatric/Behavioral:  Negative for confusion, sleep disturbance and suicidal ideas.         The following data was reviewed by JEROME Villela on 09/04/2024        Assessment & Plan   Diagnoses and all orders for this visit:    1. Routine general medical examination at a health care facility (Primary)  Assessment & Plan:  Advise regular exercise, healthy eating, always wear seat belts. Living will discussed, copy in epic, fall prevention discussed.  Immunizations discussed, she is going to check on coverage of Tdap and shingrex at her pharmacy.   To continue yearly optometry and dental exams.        Orders:  -     TSH Rfx On Abnormal To Free T4  -     CBC w AUTO Differential; Future    2. Type 2 diabetes mellitus without complication, without long-term current use of insulin  Assessment & Plan:  To work on diet, regular exercise, monitor sugars, check feet daily, see optometrist yearly or as directed.      Orders:  -     Comprehensive Metabolic Panel; Future  -     Lipid Panel; Future  -     Hemoglobin A1c; Future  -     Microalbumin / Creatinine Urine Ratio - Urine, Clean Catch; Future    3. Mixed hyperlipidemia  Assessment & Plan:   Continue current medication and efforts with diet and exercise.         4. Essential hypertension  Assessment & Plan:  Hypertension is stable.advised to monitor BP at home. Continue  current meds. Continue to modify diet and lifestyle. she is fasting today.       5. Facial skin lesion  Assessment & Plan:  Refer back to derm     Orders:  -     Ambulatory Referral to Dermatology    6. Postmenopausal  Assessment & Plan:  Scheduling DEXA     Orders:  -     DEXA Bone Density Axial; Future    7. Encounter for screening mammogram for malignant neoplasm of breast  Assessment & Plan:  Mammogram due, scheduling, continue to do  monthly BSE     Orders:  -     Mammo Screening Digital Tomosynthesis Bilateral With CAD; Future    8. Umbilical hernia without obstruction and without gangrene  Assessment & Plan:  Will send her to a surgeon of her choice for consult     Orders:  -     Ambulatory Referral to General Surgery          Class 3 Severe Obesity (BMI >=40). Obesity-related health conditions include the following: hypertension, diabetes mellitus, and dyslipidemias. Obesity is improving with lifestyle modifications. BMI is is above average; BMI management plan is completed. We discussed portion control and increasing exercise.       FOLLOW UP  Return if symptoms worsen or fail to improve, for followup pending lab results.  Patient was given instructions and counseling regarding her condition or for health maintenance advice. Please see specific information pulled into the AVS if appropriate.     Jesusita Rodriguez, JEROME  09/04/24  11:37 EDT

## 2024-10-18 ENCOUNTER — HOSPITAL ENCOUNTER (OUTPATIENT)
Dept: BONE DENSITY | Facility: HOSPITAL | Age: 69
Discharge: HOME | End: 2024-10-18
Payer: MEDICARE

## 2024-10-18 ENCOUNTER — HOSPITAL ENCOUNTER (OUTPATIENT)
Dept: MAMMOGRAPHY | Facility: HOSPITAL | Age: 69
Discharge: HOME | End: 2024-10-18
Payer: MEDICARE

## 2024-10-18 DIAGNOSIS — Z12.31 ENCOUNTER FOR SCREENING MAMMOGRAM FOR MALIGNANT NEOPLASM OF BREAST: ICD-10-CM

## 2024-10-18 DIAGNOSIS — Z78.0 POSTMENOPAUSAL: ICD-10-CM

## 2024-10-18 PROCEDURE — 77080 DXA BONE DENSITY AXIAL: CPT

## 2024-10-18 PROCEDURE — 77063 BREAST TOMOSYNTHESIS BI: CPT

## 2024-10-18 PROCEDURE — 77067 SCR MAMMO BI INCL CAD: CPT

## 2024-11-11 DIAGNOSIS — I10 ESSENTIAL HYPERTENSION: ICD-10-CM

## 2024-11-11 DIAGNOSIS — E78.2 MIXED HYPERLIPIDEMIA: ICD-10-CM

## 2024-11-12 RX ORDER — ATORVASTATIN CALCIUM 20 MG/1
20 TABLET, FILM COATED ORAL DAILY
Qty: 90 TABLET | Refills: 1 | Status: SHIPPED | OUTPATIENT
Start: 2024-11-12

## 2024-11-12 RX ORDER — HYDROCHLOROTHIAZIDE 25 MG/1
25 TABLET ORAL DAILY
Qty: 90 TABLET | Refills: 1 | Status: SHIPPED | OUTPATIENT
Start: 2024-11-12

## 2024-11-12 RX ORDER — ATENOLOL 50 MG/1
50 TABLET ORAL DAILY
Qty: 90 TABLET | Refills: 1 | Status: SHIPPED | OUTPATIENT
Start: 2024-11-12

## 2024-11-16 DIAGNOSIS — E11.9 TYPE 2 DIABETES MELLITUS WITHOUT COMPLICATION, WITHOUT LONG-TERM CURRENT USE OF INSULIN: ICD-10-CM

## 2024-11-18 RX ORDER — METFORMIN HYDROCHLORIDE 500 MG/1
TABLET, EXTENDED RELEASE ORAL
Qty: 270 TABLET | Refills: 1 | Status: SHIPPED | OUTPATIENT
Start: 2024-11-18

## 2025-01-13 DIAGNOSIS — E11.9 TYPE 2 DIABETES MELLITUS WITHOUT COMPLICATION, WITHOUT LONG-TERM CURRENT USE OF INSULIN: Primary | ICD-10-CM

## 2025-01-13 RX ORDER — LANCETS
EACH MISCELLANEOUS
Qty: 100 EACH | Refills: 3 | Status: SHIPPED | OUTPATIENT
Start: 2025-01-13

## 2025-01-13 RX ORDER — BLOOD SUGAR DIAGNOSTIC
STRIP MISCELLANEOUS
Qty: 100 EACH | Refills: 3 | Status: SHIPPED | OUTPATIENT
Start: 2025-01-13

## 2025-03-05 ENCOUNTER — LAB (OUTPATIENT)
Dept: LAB | Facility: HOSPITAL | Age: 70
End: 2025-03-05
Payer: MEDICARE

## 2025-03-05 ENCOUNTER — OFFICE VISIT (OUTPATIENT)
Dept: FAMILY MEDICINE CLINIC | Age: 70
End: 2025-03-05
Payer: MEDICARE

## 2025-03-05 VITALS
HEIGHT: 62 IN | SYSTOLIC BLOOD PRESSURE: 136 MMHG | HEART RATE: 76 BPM | BODY MASS INDEX: 43.65 KG/M2 | DIASTOLIC BLOOD PRESSURE: 84 MMHG | WEIGHT: 237.2 LBS | OXYGEN SATURATION: 96 % | TEMPERATURE: 97.5 F

## 2025-03-05 DIAGNOSIS — K42.9 UMBILICAL HERNIA WITHOUT OBSTRUCTION AND WITHOUT GANGRENE: ICD-10-CM

## 2025-03-05 DIAGNOSIS — E78.2 MIXED HYPERLIPIDEMIA: Primary | ICD-10-CM

## 2025-03-05 DIAGNOSIS — I10 ESSENTIAL HYPERTENSION: ICD-10-CM

## 2025-03-05 DIAGNOSIS — E11.9 TYPE 2 DIABETES MELLITUS WITHOUT COMPLICATION, WITHOUT LONG-TERM CURRENT USE OF INSULIN: ICD-10-CM

## 2025-03-05 DIAGNOSIS — E78.2 MIXED HYPERLIPIDEMIA: ICD-10-CM

## 2025-03-05 PROBLEM — N64.52 NIPPLE DISCHARGE: Status: RESOLVED | Noted: 2023-10-05 | Resolved: 2025-03-05

## 2025-03-05 LAB
ALBUMIN SERPL-MCNC: 4.3 G/DL (ref 3.5–5.2)
ALBUMIN UR-MCNC: 1.7 MG/DL
ALBUMIN/GLOB SERPL: 1.6 G/DL
ALP SERPL-CCNC: 74 U/L (ref 39–117)
ALT SERPL W P-5'-P-CCNC: 47 U/L (ref 1–33)
ANION GAP SERPL CALCULATED.3IONS-SCNC: 13.5 MMOL/L (ref 5–15)
AST SERPL-CCNC: 67 U/L (ref 1–32)
BILIRUB SERPL-MCNC: 0.6 MG/DL (ref 0–1.2)
BUN SERPL-MCNC: 11 MG/DL (ref 8–23)
BUN/CREAT SERPL: 18.3 (ref 7–25)
CALCIUM SPEC-SCNC: 10 MG/DL (ref 8.6–10.5)
CHLORIDE SERPL-SCNC: 100 MMOL/L (ref 98–107)
CHOLEST SERPL-MCNC: 172 MG/DL (ref 0–200)
CO2 SERPL-SCNC: 25.5 MMOL/L (ref 22–29)
CREAT SERPL-MCNC: 0.6 MG/DL (ref 0.57–1)
CREAT UR-MCNC: 63.9 MG/DL
EGFRCR SERPLBLD CKD-EPI 2021: 97.9 ML/MIN/1.73
GLOBULIN UR ELPH-MCNC: 2.7 GM/DL
GLUCOSE SERPL-MCNC: 133 MG/DL (ref 65–99)
HBA1C MFR BLD: 7.3 % (ref 4.8–5.6)
HDLC SERPL-MCNC: 34 MG/DL (ref 40–60)
LDLC SERPL CALC-MCNC: 105 MG/DL (ref 0–100)
LDLC/HDLC SERPL: 2.95 {RATIO}
MICROALBUMIN/CREAT UR: 26.6 MG/G (ref 0–29)
POTASSIUM SERPL-SCNC: 4 MMOL/L (ref 3.5–5.2)
PROT SERPL-MCNC: 7 G/DL (ref 6–8.5)
SODIUM SERPL-SCNC: 139 MMOL/L (ref 136–145)
TRIGL SERPL-MCNC: 189 MG/DL (ref 0–150)
VLDLC SERPL-MCNC: 33 MG/DL (ref 5–40)

## 2025-03-05 PROCEDURE — 83036 HEMOGLOBIN GLYCOSYLATED A1C: CPT

## 2025-03-05 PROCEDURE — 36415 COLL VENOUS BLD VENIPUNCTURE: CPT

## 2025-03-05 PROCEDURE — 80061 LIPID PANEL: CPT

## 2025-03-05 PROCEDURE — 82043 UR ALBUMIN QUANTITATIVE: CPT

## 2025-03-05 PROCEDURE — 80053 COMPREHEN METABOLIC PANEL: CPT

## 2025-03-05 PROCEDURE — 82570 ASSAY OF URINE CREATININE: CPT

## 2025-03-05 NOTE — PROGRESS NOTES
Chief Complaint  Hypertension (6 month follow up HTN, HLD, and diabetes.)    Subjective          Ro Conti presents to [facility]  History of Present Illness  Diabetes:  Current medication: metformin XR  Tolerating medication: Yes  Pt does have a hernia and has seen surgeon who asked about weight loss and possibly taking a GLP 1 , she is not sure she wants to take that type of rx, is trying to eat healthy, exercise and has lost some weight   Last eye exam: 1-30-25 [facility] / then went to [facility]   Last foot exam: 9-2024  At home BS ranges: 130's  (112-150) overall improved   Lab Results       Component                Value               Date                       HGBA1C                   7.40 (H)            09/04/2024                Hyperlipidemia  Current medication: lipitor  Tolerating medication: Yes  Needs Refill: no, uses mail order and may switch pharmacy    Lab Results       Component                Value               Date                       CHOL                     152                 09/04/2024                 CHLPL                    157                 05/07/2021                 TRIG                     170 (H)             09/04/2024                 HDL                      29 (L)              09/04/2024                 LDL                      93                  09/04/2024                Hypertension:  Current medication: atenolol, HCTZ  Tolerating Medication: Yes  Checking BP at home and it is: not checking   Needs refills: no, may switch pharmacy   Labs:  Lab Results       Component                Value               Date                       GLUCOSE                  154 (H)             09/04/2024                 BUN                      11                  09/04/2024                 CREATININE               0.63                09/04/2024                 EGFRIFNONA               86                  11/23/2021                 BCR                       17.5                2024                 K                        4.1                 2024                 CO2                      26.8                2024                 CALCIUM                  10.2                2024                 ALBUMIN                  4.4                 2024                 AST                      54 (H)              2024                 ALT                      41 (H)              2024              PAST MEDICAL HISTORY no changes since 2024:            GYNECOLOGICAL HISTORY:        Menopause at age 50.    Last Pap was 19         PREVENTIVE HEALTH MAINTENANCE            COLORECTAL CANCER SCREENING: Up to date (colonoscopy q10y; sigmoidoscopy q5y; Cologuard q3y) was last done  3-2016, Results are in chart; colonoscopy with the following abnormalities noted-- scattered diverticulosis;  10-8-21 tubular adenoma and hyperplastic polyp The next colonoscopy is due       Hepatitis C Medicare Screening: was last done ; negative             Surgical History:      23 biopsy left breast, benign       Eye laser procedure R 24    Cataract Removal: right; ;     : X 1;     Tubal Ligation     lasik ;     Hernia Repair: ; umbilical;     Procedures:    Colonoscopy ( 3-11-16 dr snyder, scattered divertiuclar dx ) & 3-2022        Family History:        Father:  at age 89; Cause of death was septic meningitis;  Hypertension;  Colon Cancer; Prostate Cancer; Skin Cancer ( melanoma );  Dementia     Mother:  at age 84; Cause of death was brain bleed;  Hypertension;  Pulmonary Hypertension;  Renal Insufficency     Brother(s): 3 brother(s) total; 1 ;  1 Prostate Cancer;  MVA     Sister(s): 4 sister(s) total; 1 ;  Breast Cancer ( 2 sisters ); 1 Colon Cancer     Son(s): Hernandez sarcoma     Maternal Grandmother: Coronary Artery Disease         Social History:         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;     Marital Status:      Children: 1 child                          Past Medical History:   Diagnosis Date    Colon polyp     Hyperlipidemia     Hypertension     Nonalcoholic fatty liver disease     Type 2 diabetes mellitus without complication        No Known Allergies     Past Surgical History:   Procedure Laterality Date    CATARACT EXTRACTION Right 2019     SECTION      X1    COLONOSCOPY N/A 2016 &     DIVERTICULAR    HERNIA REPAIR  2015    UMBILICAL    LASIK  2000    TUBAL ABDOMINAL LIGATION          Social History     Tobacco Use    Smoking status: Never     Passive exposure: Yes    Smokeless tobacco: Never   Substance Use Topics    Alcohol use: Yes     Alcohol/week: 12.0 standard drinks of alcohol     Types: 12 Cans of beer per week     Comment: 2-3 TIMES PER WK QUANTITY OF ALCOHOL IS 2-12 DRINKS TYPICALLY BEER BUT ONLY SOCIALLY       Family History   Problem Relation Age of Onset    Hypertension Mother     Other Mother         RENAL INSUFICENCY    Colon cancer Father     Prostate cancer Father     Skin cancer Father     Dementia Father     Hypertension Father     Breast cancer Sister         NOT SPECIFIED (2 SISTERS)    Colon cancer Sister         4 SISTERS 1     NOT SPECIFIED    Prostate cancer Brother         3 BROTHERS 1    NOT SPECIFIED MVA    Coronary artery disease Maternal Grandmother     Other Son         EWNG SARCOMA        Health Maintenance Due   Topic Date Due    TDAP/TD VACCINES (2 - Tdap) 2006    ZOSTER VACCINE (1 of 2) Never done    INFLUENZA VACCINE  Never done    COVID-19 Vaccine ( season) 2024    DIABETIC EYE EXAM  2025        Current Outpatient Medications on File Prior to Visit   Medication Sig    Accu-Chek Softclix Lancets lancets USE AS DIRECTED TO TEST BLOOD SUGAR ONE TIME DAILY     "atenolol (TENORMIN) 50 MG tablet TAKE 1 TABLET EVERY DAY    atorvastatin (LIPITOR) 20 MG tablet TAKE 1 TABLET EVERY DAY    Blood Glucose Monitoring Suppl (Accu-Chek Judi Plus) w/Device kit Use as instructed to test blood sugar once daily    glucose blood (Accu-Chek Judi Plus) test strip USE AS DIRECTED TO TEST BLOOD SUGAR ONE TIME DAILY    hydroCHLOROthiazide 25 MG tablet TAKE 1 TABLET EVERY DAY    metFORMIN ER (GLUCOPHAGE-XR) 500 MG 24 hr tablet TAKE 3 TABLETS EVERY DAY WITH LARGEST MEAL    Omega-3 Fatty Acids (fish oil) 1000 MG capsule capsule Take  by mouth Daily With Breakfast.    Psyllium (Fiber) 28.3 % powder Take  by mouth 3 (Three) Times a Day.     No current facility-administered medications on file prior to visit.       Immunization History   Administered Date(s) Administered    COVID-19 (PFIZER) Purple Cap Monovalent 03/15/2021, 04/05/2021, 11/12/2021    Pneumococcal Conjugate 20-Valent (PCV20) 08/30/2022    Td (TDVAX) 07/01/1996       Review of Systems   Constitutional:  Negative for fatigue and fever.   Respiratory:  Negative for cough and shortness of breath.    Cardiovascular:  Negative for chest pain, palpitations and leg swelling.        Objective     Vitals:    03/05/25 0948   BP: 136/84   BP Location: Left arm   Patient Position: Sitting   Cuff Size: Large Adult   Pulse: 76   Temp: 97.5 °F (36.4 °C)   TempSrc: Oral   SpO2: 96%   Weight: 108 kg (237 lb 3.2 oz)   Height: 157.5 cm (62\")            Physical Exam  Vitals reviewed.   Constitutional:       General: She is not in acute distress.     Appearance: Normal appearance.   Neck:      Vascular: No carotid bruit.   Cardiovascular:      Rate and Rhythm: Normal rate and regular rhythm.      Heart sounds: Normal heart sounds. No murmur heard.  Pulmonary:      Effort: Pulmonary effort is normal. No respiratory distress.      Breath sounds: Normal breath sounds.   Musculoskeletal:      Right lower leg: No edema.      Left lower leg: No edema. "   Neurological:      Mental Status: She is alert.   Psychiatric:         Mood and Affect: Mood normal.         Behavior: Behavior normal.         Result Review :     The following data was reviewed by: JEROME Villela on 03/05/2025:                       Assessment and Plan      Diagnoses and all orders for this visit:    1. Mixed hyperlipidemia (Primary)  Assessment & Plan:   She is fasting today and I sent her to lab.  Continue current medication and efforts with diet and exercise.   She will check on her pharmacy coverage and let me know where to send her next refills on all her rx's.     Orders:  -     Comprehensive Metabolic Panel; Future  -     Lipid Panel; Future    2. Essential hypertension  Assessment & Plan:  Hypertension is stable. Advised to monitor BP at home. Continue current meds. Continue to modify diet and lifestyle.     Orders:  -     Comprehensive Metabolic Panel; Future    3. Type 2 diabetes mellitus without complication, without long-term current use of insulin  Assessment & Plan:  To continue her efforts to work on diet, regular exercise, monitor sugars, reviewed her blood sugar log, see optometrist yearly or as directed.  Discussed GLP 1 safety issues/risk vs benefit.  Will send her to lab today.     Orders:  -     Comprehensive Metabolic Panel; Future  -     Lipid Panel; Future  -     Hemoglobin A1c; Future  -     Microalbumin / Creatinine Urine Ratio - Urine, Clean Catch; Future    4. Umbilical hernia without obstruction and without gangrene  Assessment & Plan:  She will follow up with her surgeon                  I spent 22 minutes caring for Ro on this date of service. This time includes time spent by me in the following activities:preparing for the visit, reviewing tests, obtaining and/or reviewing a separately obtained history, performing a medically appropriate examination and/or evaluation , counseling and educating the patient/family/caregiver, ordering medications,  tests, or procedures, and documenting information in the medical record    Follow Up     Return for followup pending lab results.    Patient was given instructions and counseling regarding her condition or for health maintenance advice. Please see specific information pulled into the AVS if appropriate.

## 2025-03-05 NOTE — ASSESSMENT & PLAN NOTE
To continue her efforts to work on diet, regular exercise, monitor sugars, reviewed her blood sugar log, see optometrist yearly or as directed.  Discussed GLP 1 safety issues/risk vs benefit.  Will send her to lab today.

## 2025-03-05 NOTE — ASSESSMENT & PLAN NOTE
She is fasting today and I sent her to lab.  Continue current medication and efforts with diet and exercise.   She will check on her pharmacy coverage and let me know where to send her next refills on all her rx's.

## 2025-05-13 DIAGNOSIS — I10 ESSENTIAL HYPERTENSION: ICD-10-CM

## 2025-05-13 DIAGNOSIS — E78.2 MIXED HYPERLIPIDEMIA: ICD-10-CM

## 2025-05-13 DIAGNOSIS — E11.9 TYPE 2 DIABETES MELLITUS WITHOUT COMPLICATION, WITHOUT LONG-TERM CURRENT USE OF INSULIN: ICD-10-CM

## 2025-05-13 RX ORDER — HYDROCHLOROTHIAZIDE 25 MG/1
25 TABLET ORAL DAILY
Qty: 90 TABLET | Refills: 0 | Status: SHIPPED | OUTPATIENT
Start: 2025-05-13

## 2025-05-13 RX ORDER — ATENOLOL 50 MG/1
50 TABLET ORAL DAILY
Qty: 90 TABLET | Refills: 0 | Status: SHIPPED | OUTPATIENT
Start: 2025-05-13

## 2025-05-13 RX ORDER — METFORMIN HYDROCHLORIDE 500 MG/1
TABLET, EXTENDED RELEASE ORAL
Qty: 270 TABLET | Refills: 0 | Status: SHIPPED | OUTPATIENT
Start: 2025-05-13

## 2025-05-13 RX ORDER — ATORVASTATIN CALCIUM 20 MG/1
20 TABLET, FILM COATED ORAL DAILY
Qty: 90 TABLET | Refills: 0 | Status: SHIPPED | OUTPATIENT
Start: 2025-05-13

## 2025-06-26 ENCOUNTER — TELEPHONE (OUTPATIENT)
Dept: FAMILY MEDICINE CLINIC | Age: 70
End: 2025-06-26
Payer: MEDICARE

## 2025-06-26 NOTE — TELEPHONE ENCOUNTER
1st attempt: spoke with pt re re overdue labs ordered 3/10/25. She states it will be the week after 4th of July before she can compete. Will PP until then

## 2025-07-08 ENCOUNTER — LAB (OUTPATIENT)
Dept: LAB | Facility: HOSPITAL | Age: 70
End: 2025-07-08
Payer: MEDICARE

## 2025-07-08 DIAGNOSIS — E11.9 TYPE 2 DIABETES MELLITUS WITHOUT COMPLICATION, WITHOUT LONG-TERM CURRENT USE OF INSULIN: ICD-10-CM

## 2025-07-08 DIAGNOSIS — R79.89 ELEVATED LFTS: ICD-10-CM

## 2025-07-08 LAB
ALBUMIN SERPL-MCNC: 4.3 G/DL (ref 3.5–5.2)
ALBUMIN/GLOB SERPL: 1.5 G/DL
ALP SERPL-CCNC: 66 U/L (ref 39–117)
ALT SERPL W P-5'-P-CCNC: 28 U/L (ref 1–33)
ANION GAP SERPL CALCULATED.3IONS-SCNC: 13 MMOL/L (ref 5–15)
AST SERPL-CCNC: 41 U/L (ref 1–32)
BILIRUB SERPL-MCNC: 0.6 MG/DL (ref 0–1.2)
BUN SERPL-MCNC: 8 MG/DL (ref 8–23)
BUN/CREAT SERPL: 13.6 (ref 7–25)
CALCIUM SPEC-SCNC: 9.8 MG/DL (ref 8.6–10.5)
CHLORIDE SERPL-SCNC: 100 MMOL/L (ref 98–107)
CO2 SERPL-SCNC: 26 MMOL/L (ref 22–29)
CREAT SERPL-MCNC: 0.59 MG/DL (ref 0.57–1)
EGFRCR SERPLBLD CKD-EPI 2021: 98.3 ML/MIN/1.73
GLOBULIN UR ELPH-MCNC: 2.8 GM/DL
GLUCOSE SERPL-MCNC: 118 MG/DL (ref 65–99)
HBA1C MFR BLD: 7 % (ref 4.8–5.6)
POTASSIUM SERPL-SCNC: 3.8 MMOL/L (ref 3.5–5.2)
PROT SERPL-MCNC: 7.1 G/DL (ref 6–8.5)
SODIUM SERPL-SCNC: 139 MMOL/L (ref 136–145)

## 2025-07-08 PROCEDURE — 83036 HEMOGLOBIN GLYCOSYLATED A1C: CPT

## 2025-07-08 PROCEDURE — 36415 COLL VENOUS BLD VENIPUNCTURE: CPT

## 2025-07-08 PROCEDURE — 80053 COMPREHEN METABOLIC PANEL: CPT

## 2025-08-14 DIAGNOSIS — E11.9 TYPE 2 DIABETES MELLITUS WITHOUT COMPLICATION, WITHOUT LONG-TERM CURRENT USE OF INSULIN: ICD-10-CM

## 2025-08-14 DIAGNOSIS — I10 ESSENTIAL HYPERTENSION: ICD-10-CM

## 2025-08-14 DIAGNOSIS — E78.2 MIXED HYPERLIPIDEMIA: ICD-10-CM

## 2025-08-14 RX ORDER — ATENOLOL 50 MG/1
50 TABLET ORAL DAILY
Qty: 90 TABLET | Refills: 0 | Status: SHIPPED | OUTPATIENT
Start: 2025-08-14

## 2025-08-14 RX ORDER — ATORVASTATIN CALCIUM 20 MG/1
20 TABLET, FILM COATED ORAL DAILY
Qty: 90 TABLET | Refills: 0 | Status: SHIPPED | OUTPATIENT
Start: 2025-08-14

## 2025-08-14 RX ORDER — METFORMIN HYDROCHLORIDE 500 MG/1
TABLET, EXTENDED RELEASE ORAL
Qty: 270 TABLET | Refills: 0 | Status: SHIPPED | OUTPATIENT
Start: 2025-08-14

## 2025-08-14 RX ORDER — HYDROCHLOROTHIAZIDE 25 MG/1
25 TABLET ORAL DAILY
Qty: 90 TABLET | Refills: 0 | Status: SHIPPED | OUTPATIENT
Start: 2025-08-14